# Patient Record
Sex: MALE | URBAN - METROPOLITAN AREA
[De-identification: names, ages, dates, MRNs, and addresses within clinical notes are randomized per-mention and may not be internally consistent; named-entity substitution may affect disease eponyms.]

---

## 2024-02-13 ENCOUNTER — EMERGENCY (EMERGENCY)
Facility: HOSPITAL | Age: 49
LOS: 1 days | Discharge: HOME | End: 2024-02-13
Attending: EMERGENCY MEDICINE
Payer: MEDICAID

## 2024-02-13 VITALS
OXYGEN SATURATION: 99 % | DIASTOLIC BLOOD PRESSURE: 83 MMHG | SYSTOLIC BLOOD PRESSURE: 128 MMHG | RESPIRATION RATE: 18 BRPM | TEMPERATURE: 98 F | HEART RATE: 97 BPM

## 2024-02-13 VITALS
SYSTOLIC BLOOD PRESSURE: 145 MMHG | RESPIRATION RATE: 22 BRPM | OXYGEN SATURATION: 96 % | TEMPERATURE: 99 F | DIASTOLIC BLOOD PRESSURE: 83 MMHG | HEART RATE: 98 BPM | WEIGHT: 160.06 LBS

## 2024-02-13 DIAGNOSIS — F22 DELUSIONAL DISORDERS: ICD-10-CM

## 2024-02-13 LAB
AMPHET UR-MCNC: POSITIVE
ANION GAP SERPL CALC-SCNC: 11 MMOL/L — SIGNIFICANT CHANGE UP (ref 5–17)
APAP SERPL-MCNC: <3 UG/ML — LOW (ref 10–26)
APPEARANCE UR: CLEAR — SIGNIFICANT CHANGE UP
BARBITURATES UR SCN-MCNC: NEGATIVE — SIGNIFICANT CHANGE UP
BASOPHILS # BLD AUTO: 0.01 K/UL — SIGNIFICANT CHANGE UP (ref 0–0.2)
BASOPHILS NFR BLD AUTO: 0.2 % — SIGNIFICANT CHANGE UP (ref 0–2)
BENZODIAZ UR-MCNC: NEGATIVE — SIGNIFICANT CHANGE UP
BILIRUB UR-MCNC: NEGATIVE — SIGNIFICANT CHANGE UP
BUN SERPL-MCNC: 13.9 MG/DL — SIGNIFICANT CHANGE UP (ref 8–20)
CALCIUM SERPL-MCNC: 9 MG/DL — SIGNIFICANT CHANGE UP (ref 8.4–10.5)
CHLORIDE SERPL-SCNC: 103 MMOL/L — SIGNIFICANT CHANGE UP (ref 96–108)
CO2 SERPL-SCNC: 25 MMOL/L — SIGNIFICANT CHANGE UP (ref 22–29)
COCAINE METAB.OTHER UR-MCNC: NEGATIVE — SIGNIFICANT CHANGE UP
COLOR SPEC: YELLOW — SIGNIFICANT CHANGE UP
CREAT SERPL-MCNC: 0.75 MG/DL — SIGNIFICANT CHANGE UP (ref 0.5–1.3)
DIFF PNL FLD: NEGATIVE — SIGNIFICANT CHANGE UP
EGFR: 111 ML/MIN/1.73M2 — SIGNIFICANT CHANGE UP
EOSINOPHIL # BLD AUTO: 0.02 K/UL — SIGNIFICANT CHANGE UP (ref 0–0.5)
EOSINOPHIL NFR BLD AUTO: 0.4 % — SIGNIFICANT CHANGE UP (ref 0–6)
ETHANOL SERPL-MCNC: <10 MG/DL — SIGNIFICANT CHANGE UP (ref 0–9)
GLUCOSE SERPL-MCNC: 122 MG/DL — HIGH (ref 70–99)
GLUCOSE UR QL: NEGATIVE MG/DL — SIGNIFICANT CHANGE UP
HCT VFR BLD CALC: 37.3 % — LOW (ref 39–50)
HGB BLD-MCNC: 12.8 G/DL — LOW (ref 13–17)
IMM GRANULOCYTES NFR BLD AUTO: 0.2 % — SIGNIFICANT CHANGE UP (ref 0–0.9)
KETONES UR-MCNC: 15 MG/DL
LEUKOCYTE ESTERASE UR-ACNC: NEGATIVE — SIGNIFICANT CHANGE UP
LYMPHOCYTES # BLD AUTO: 0.77 K/UL — LOW (ref 1–3.3)
LYMPHOCYTES # BLD AUTO: 14.9 % — SIGNIFICANT CHANGE UP (ref 13–44)
MCHC RBC-ENTMCNC: 28.9 PG — SIGNIFICANT CHANGE UP (ref 27–34)
MCHC RBC-ENTMCNC: 34.3 GM/DL — SIGNIFICANT CHANGE UP (ref 32–36)
MCV RBC AUTO: 84.2 FL — SIGNIFICANT CHANGE UP (ref 80–100)
METHADONE UR-MCNC: NEGATIVE — SIGNIFICANT CHANGE UP
MONOCYTES # BLD AUTO: 0.28 K/UL — SIGNIFICANT CHANGE UP (ref 0–0.9)
MONOCYTES NFR BLD AUTO: 5.4 % — SIGNIFICANT CHANGE UP (ref 2–14)
NEUTROPHILS # BLD AUTO: 4.09 K/UL — SIGNIFICANT CHANGE UP (ref 1.8–7.4)
NEUTROPHILS NFR BLD AUTO: 78.9 % — HIGH (ref 43–77)
NITRITE UR-MCNC: NEGATIVE — SIGNIFICANT CHANGE UP
OPIATES UR-MCNC: NEGATIVE — SIGNIFICANT CHANGE UP
PCP SPEC-MCNC: SIGNIFICANT CHANGE UP
PCP UR-MCNC: NEGATIVE — SIGNIFICANT CHANGE UP
PH UR: 6 — SIGNIFICANT CHANGE UP (ref 5–8)
PLATELET # BLD AUTO: 213 K/UL — SIGNIFICANT CHANGE UP (ref 150–400)
POTASSIUM SERPL-MCNC: 3.5 MMOL/L — SIGNIFICANT CHANGE UP (ref 3.5–5.3)
POTASSIUM SERPL-SCNC: 3.5 MMOL/L — SIGNIFICANT CHANGE UP (ref 3.5–5.3)
PROT UR-MCNC: SIGNIFICANT CHANGE UP MG/DL
RAPID RVP RESULT: SIGNIFICANT CHANGE UP
RBC # BLD: 4.43 M/UL — SIGNIFICANT CHANGE UP (ref 4.2–5.8)
RBC # FLD: 12.3 % — SIGNIFICANT CHANGE UP (ref 10.3–14.5)
SALICYLATES SERPL-MCNC: <0.6 MG/DL — LOW (ref 10–20)
SARS-COV-2 RNA SPEC QL NAA+PROBE: SIGNIFICANT CHANGE UP
SODIUM SERPL-SCNC: 139 MMOL/L — SIGNIFICANT CHANGE UP (ref 135–145)
SP GR SPEC: >1.03 — HIGH (ref 1–1.03)
THC UR QL: NEGATIVE — SIGNIFICANT CHANGE UP
UROBILINOGEN FLD QL: 1 MG/DL — SIGNIFICANT CHANGE UP (ref 0.2–1)
WBC # BLD: 5.18 K/UL — SIGNIFICANT CHANGE UP (ref 3.8–10.5)
WBC # FLD AUTO: 5.18 K/UL — SIGNIFICANT CHANGE UP (ref 3.8–10.5)

## 2024-02-13 PROCEDURE — 93010 ELECTROCARDIOGRAM REPORT: CPT

## 2024-02-13 PROCEDURE — 36415 COLL VENOUS BLD VENIPUNCTURE: CPT

## 2024-02-13 PROCEDURE — 90792 PSYCH DIAG EVAL W/MED SRVCS: CPT

## 2024-02-13 PROCEDURE — G1004: CPT

## 2024-02-13 PROCEDURE — 81003 URINALYSIS AUTO W/O SCOPE: CPT

## 2024-02-13 PROCEDURE — 80048 BASIC METABOLIC PNL TOTAL CA: CPT

## 2024-02-13 PROCEDURE — 70450 CT HEAD/BRAIN W/O DYE: CPT | Mod: 26,MG

## 2024-02-13 PROCEDURE — 80307 DRUG TEST PRSMV CHEM ANLYZR: CPT

## 2024-02-13 PROCEDURE — 0225U NFCT DS DNA&RNA 21 SARSCOV2: CPT

## 2024-02-13 PROCEDURE — 70450 CT HEAD/BRAIN W/O DYE: CPT | Mod: MG

## 2024-02-13 PROCEDURE — 93005 ELECTROCARDIOGRAM TRACING: CPT

## 2024-02-13 PROCEDURE — 85025 COMPLETE CBC W/AUTO DIFF WBC: CPT

## 2024-02-13 PROCEDURE — G0378: CPT

## 2024-02-13 PROCEDURE — 71045 X-RAY EXAM CHEST 1 VIEW: CPT

## 2024-02-13 PROCEDURE — 99223 1ST HOSP IP/OBS HIGH 75: CPT

## 2024-02-13 PROCEDURE — 99285 EMERGENCY DEPT VISIT HI MDM: CPT

## 2024-02-13 PROCEDURE — 71045 X-RAY EXAM CHEST 1 VIEW: CPT | Mod: 26

## 2024-02-13 RX ORDER — IBUPROFEN 200 MG
600 TABLET ORAL ONCE
Refills: 0 | Status: COMPLETED | OUTPATIENT
Start: 2024-02-13 | End: 2024-02-13

## 2024-02-13 RX ORDER — SODIUM CHLORIDE 9 MG/ML
1000 INJECTION, SOLUTION INTRAVENOUS ONCE
Refills: 0 | Status: COMPLETED | OUTPATIENT
Start: 2024-02-13 | End: 2024-02-13

## 2024-02-13 RX ORDER — CARBIDOPA AND LEVODOPA 25; 100 MG/1; MG/1
1 TABLET ORAL
Refills: 0 | Status: DISCONTINUED | OUTPATIENT
Start: 2024-02-13 | End: 2024-02-20

## 2024-02-13 RX ORDER — DIPHENHYDRAMINE HCL 50 MG
50 CAPSULE ORAL ONCE
Refills: 0 | Status: COMPLETED | OUTPATIENT
Start: 2024-02-13 | End: 2024-02-13

## 2024-02-13 RX ADMIN — SODIUM CHLORIDE 1000 MILLILITER(S): 9 INJECTION, SOLUTION INTRAVENOUS at 12:00

## 2024-02-13 RX ADMIN — CARBIDOPA AND LEVODOPA 1 TABLET(S): 25; 100 TABLET ORAL at 18:39

## 2024-02-13 RX ADMIN — Medication 600 MILLIGRAM(S): at 18:34

## 2024-02-13 NOTE — ED BEHAVIORAL HEALTH ASSESSMENT NOTE - SUMMARY
48 year old man  domiciled hs educated non caregiver employed though applying for disability no formal psychiatric history (no mental health diagnoses no prior inpatient psychiatric hospitalizations no outpatient mental health follow up no prior suicide attempts or non suicidal self injurious behavior no instances of violence aggression violation of orders of protection), [facility] parkinsons who was brought in by ems activated by ?police for evaluation. 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  appears to have chronic fixed false delusions possibly related to medication misuse vs parkinsons psychosis. Collateral informant is without any significant safety concerns, comfortable with patient's discharge. Patient does not wish to be admitted. Combined psychopharmacology and outpatient psychotherapy are the primary treatment modalities that are most likely to assist the patient in developing more productive means of managing his mental health conditions and navigating stressors/substance use, rather than an inpatient psychiatric admission.

## 2024-02-13 NOTE — ED ADULT TRIAGE NOTE - CHIEF COMPLAINT QUOTE
BIBEMS for bizarre behavior. As per wife pt was going to [employer] this am but winded up at the [employer] courts stating that he needs help at his house. PT has an autistic son at home who had an episode this am. PT with hx of parkinson's but non complaint with meds. PT twitching in bed and not answering RN questions. Placed in yellow gown.

## 2024-02-13 NOTE — ED CDU PROVIDER DISPOSITION NOTE - PATIENT PORTAL LINK FT
You can access the FollowMyHealth Patient Portal offered by [facility] by registering at the following website: [email or web address]. By joining [email or web address]’s FollowMyHealth portal, you will also be able to view your health information using other applications (apps) compatible with our system.

## 2024-02-13 NOTE — ED ADULT NURSE NOTE - CAS DISCH TRANSFER METHOD
Patient picked up by his wife after being cleared. Assisted to ED waiting room at this time./Private car

## 2024-02-13 NOTE — ED BEHAVIORAL HEALTH ASSESSMENT NOTE - DETAILS
16 year old as per above na Advised patient to go to nearest ER or call 911, for any of the followin) agitation aggression or anxiety, 2) suicidal or homicidal thoughts 3) worsening of symptoms or 4) side effects of medications

## 2024-02-13 NOTE — ED BEHAVIORAL HEALTH ASSESSMENT NOTE - HPI (INCLUDE ILLNESS QUALITY, SEVERITY, DURATION, TIMING, CONTEXT, MODIFYING FACTORS, ASSOCIATED SIGNS AND SYMPTOMS)
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    Per chart review  Current ED encounter  "48 M history of Parkinson's disease presenting with acute change in mental status.  As per EMR patient was in normal state of health this morning, was supposed to go to grocery store however ended up at courts stating he had a problem at home and referred by officials to emergency department for evaluation.  Patient is poor historian, awake and aware of himself and surroundings however selective and responses."    Received while resting in bed. Throughout encounter appears uncomfortable (states 2/2 chronic pain) with baseline resting tremors guarded. Clarified events leading up to presentation. States that earlier today went to the court house since he believed there would be law enforcement individuals present. Eventually activated 911 since he could not find any individuals. As for rationale, guarded. States that he's embarrassed and does not wish to be judged. Makes vague remarks about being concerned about his son's safety and his wife being "manipulative" and possibly narcissistic. Denies any acute or significant psychiatric issues concerns or complaints. Stable mood with no anhedonia or other depressive symptoms. Does not appear overtly manic. Denies any perceptual disturbances though may have ?chronic ?paranoid delusions.  States that he has parkinsons (for which he's prescribed carbidopa levodopa  mg qid rasagiline 1 mg daily pramipexole 0.5 mg bid - confirmed through [facility]). At times overuses his medications, taking max of 6 tablets of carbidopa levodopa during a day. Adamantly denies any suicidal intention; attempts at self medicating his parkinsons. Also obtains suboxone and adderall through community. Unsure of suboxone dose though adderall typically 30 mg daily. Last used both earlier today. No alcohol or tobacco use.    Collateral information obtained from wife vinicio [phone]  states that he's been more delusional and irritable since the summer. aware that he misuses his parkinsons medication as well as suspecting medications not prescribed to him as has a history of substance abuse (vicodin/opioids). did not express acute safety concerns though concerned he may be unpredictable. 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    Per chart review  Current ED encounter  "48 M history of Parkinson's disease presenting with acute change in mental status.  As per EMR patient was in normal state of health this morning, was supposed to go to grocery store however ended up at courts stating he had a problem at home and referred by officials to emergency department for evaluation.  Patient is poor historian, awake and aware of himself and surroundings however selective and responses."    Received while resting in bed. Throughout encounter appears uncomfortable (states 2/2 chronic pain) with baseline resting tremors guarded. Clarified events leading up to presentation. States that earlier today went to the court house since he believed there would be law enforcement individuals present. Eventually activated 911 since he could not find any individuals. As for rationale, guarded. States that he's embarrassed and does not wish to be judged. Makes vague remarks about being concerned about his son's safety and his wife being "manipulative" and possibly narcissistic. Denies any acute or significant psychiatric issues concerns or complaints. Stable mood with no anhedonia or other depressive symptoms. Does not appear overtly manic. Denies any perceptual disturbances though may have ?chronic ?paranoid delusions.  States that he has parkinsons (for which he's prescribed carbidopa levodopa  mg qid rasagiline 1 mg daily pramipexole 0.5 mg bid - confirmed through [facility]). At times overuses his medications, taking max of 6 tablets of carbidopa levodopa during a day. Adamantly denies any suicidal intention; attempts at self medicating his parkinsons. Also obtains suboxone and adderall through community. Unsure of suboxone dose though adderall typically 30 mg daily. Last used both earlier today. No alcohol or tobacco use.    Collateral information obtained from wife vinicio [phone]  states that he's been delusional and irritable since the summer. aware that he misuses his parkinsons medication as well as suspecting medications not prescribed to him as has a history of substance abuse (vicodin/opioids). did not express acute safety concerns though concerned he may be unpredictable. comfortable with his clearance/discharge back to home.    patient comfortable with discharge with outpatient mental health follow up, speak with  regarding fsl appointment.

## 2024-02-13 NOTE — ED PROVIDER NOTE - CLINICAL SUMMARY MEDICAL DECISION MAKING FREE TEXT BOX
Will reach out to wife to obtain collateral, patient presenting with possible extrapyramidal symptoms likely secondary to antipsychotic medications.  Cannot obtain medical records at this time we will treat empirically with lorazepam and diphenhydramine.  Broad-based workup including CT imaging of the head. Will reach out to wife to obtain collateral, patient presenting with possible extrapyramidal symptoms likely secondary to antipsychotic medications.  Cannot obtain medical records at this time we will treat empirically with lorazepam and diphenhydramine.  Broad-based workup including CT imaging of the head.    Obtained collateral information from wife and she states that he has been paranoid over the past couple of weeks thinking people are out to get him, she also has accused her of prostitution.  She recently discovered he has a second phone out of paranoia of being tapped.  On reevaluation patient is awake alert however extremely paranoid and agitated, he feels that his son is being abused in the home and his wife is prostituting herself amongst his friends.  He admits to holding a second phone out of fear of being spyed on.  At this point given a grossly negative CT imaging of the head, unremarkable chest x-ray and laboratory studies positive for amphetamines in urine, will transfer to behavioral health for psychiatric evaluation.

## 2024-02-13 NOTE — ED ADULT NURSE NOTE - NSFALLRISKINTERV_ED_ALL_ED

## 2024-02-13 NOTE — ED BEHAVIORAL HEALTH ASSESSMENT NOTE - RISK ASSESSMENT
rf:  medical issues  substance misuse    pf:  family support  domiciled  educated  no prior psych history  no prior sa/nssib/violence

## 2024-02-13 NOTE — ED PROVIDER NOTE - PHYSICAL EXAMINATION
Vitals: Mildly hypertensive  Gen: Laying in stretcher with apparently involuntary uncoordinated skeletal muscle contractions and movements, appears restless   Head: NCAT    ENT: EOMI. No exudates  CV: RRR. Audible S1 and S2. No murmurs. 2+ radial and DP pulses   Pulm: Clear to auscultation bilaterally. No wheezes, rales, or rhonchi  Abd: soft, NTND, no rebound, no guarding  Musculoskeletal:  No peripheral edema, no calf ttp  Neurologic: Awake and able to answer questions appropriately, no obvious focal deficits, apparently involuntary uncoordinated movements of upper and lower extremities predominantly right-sided  : no CVA tenderness

## 2024-02-13 NOTE — ED PROVIDER NOTE - OBJECTIVE STATEMENT
48 M history of Parkinson's disease presenting with acute change in mental status.  As per EMR patient was in normal state of health this morning, was supposed to go to grocery store however ended up at courts stating he had a problem at home and referred by officials to emergency department for evaluation.  Patient is poor historian, awake and aware of himself and surroundings however selective and responses.

## 2024-02-13 NOTE — ED BEHAVIORAL HEALTH ASSESSMENT NOTE - OTHER PAST PSYCHIATRIC HISTORY (INCLUDE DETAILS REGARDING ONSET, COURSE OF ILLNESS, INPATIENT/OUTPATIENT TREATMENT)
no formal psychiatric history  no prior suicide attempts or non suicidal self injurious behavior  no instances of violence aggression violation of orders of protection  no inpatient psychiatric hospitalizations  no outpatient mental health follow up

## 2024-02-13 NOTE — ED CDU PROVIDER DISPOSITION NOTE - NS ED ATTENDING STATEMENT MOD
Attending Only [facility]    Medicine Progress Note - Maroon 4    Date of Admission:  1/22/2024    Assessment & Plan   Stefani Crowell is a 60 year old female admitted on 1/22/2024. She has a history of cirrhosis secondary to alcohol use currently being worked up for liver transplant complicated by need for frequent paracenteses and thoracenteses for ascites/hepatic hydrothorax, in addition to asthma and COPD 2/2 tobacco use, MDD/STEPHANIE, hypothyroidism, low back pain, and chronic hyponatremia. Admitted with decompensated cirrhosis, and E coli empyema. Course complicated by recurrent ascites, renal injury with moderate-severe hyponatremia and debility. Transitioned to comfort cares 2/9 with plans for home hospice on discharge with PleurX in place.     Plan Today:  - PleurX placement today pending results of TEG   - 1 x time dose of lactulose given increased lethargy    Decompensated alcoholic cirrhosis (Ascites, HE, EV, hepatic hydrothorax)  Gets thoras and courtney through the [facility] system. During prior hospitalization, her transplant eligibility was reopened due to completion of chemotherapy treatment and improved renal function, however with new renal failure, ongoing hyponatremia, debility/fraility she would not be a transplant candidate. Not a candidate for TIPS. MELD-Na scores persistently in the 30s.  - Hepatology following, appreciate recs  - Intermittent CAPS consult for paracentesis; had multiple paracenteses this hospitalization  - Tylenol 650mg Q8H PRN (total dose < 2g)  - Continue rifaximin, change lactulose to Miralax and titrate to <4 BM in 24 hours    > 1 x time dose of lactulose 2/12 given increased lethargy  - Continue PPI  - PleurX placement today pending results of TEG  - Abx as below, continue IV until abdominal pleurx is placed in order to temporize until discharge to home on hospice  - Initiated comfort cares 2/9/24    E coli empyema - improving on top of a history  of recurrent hepatic hydropneumothorax c/b trapped lung  On 1/23, had thora + paracentesis per IR and pleural fluid cultures yielded GNB -> E coli. Blood cultures yielded GPC, though this is most likely a contaminant. Interventional pulm placed chest tube on 1/24. CT chest on 1/25 demonstrated improved R-sided hydropneumothorax after. Repeat CT chest on 1/29 demonstrated increased gas component of the R hydropneumothorax w/o changes in overall size. Patient tolerated 6 doses of alteplase total. Repeat pleural fluid labs on 1/29 were consistent w/ ongoing infection, though marginally improved. Additionally, fluid labs sent on 2/1 demonstrated slightly improved cell count, prompting chest tube removal. With transition to comfort cares, plan to continue IV abx until after the pleurx has been placed and switch to PO after PleurX placement.   - S/p chest tube placement, now removed  - Pulmonary Consulted   > Chest tube placed 1/24, removed 2/1  - Blood cultures:   > 1/23: Staph epi. (suspect contaminant)   > 1/24: NGTD  - Pleural fluid culture:   > 1/23: E coli (Amp res.)  - Infectious Disease consulted (1/24)   > Resumed IV Ceftriaxone 1g every day, added metronidazole 500mg Q8H through PICC (2/1)  - Abx:  > Ceftriaxone: 1/23 - 1/24, 1/25 - *   > Metronidazole 500mg Q8H: 1/25 - *   > Rifaximin: 1/22 - *  > Zosyn: 1/24 - 1/25  > Vancomycin: 1/24  [  ] Dispo: Will transition to oral antibiotics for discharge home on hospice, okay to discontinue when she is unable to swallow      MATTIE and Acute on Chronic Hyponatremia due to HRS  Pt has hx of chronic hyponatremia, baseline around 122-125, she has been admitted for hyponatremia several times in the past. Presented with Na of 117, corrected with 2% NS but began to trend down when discontinued. Also with rising BUN. Nephrology re-consulted but thought not to be good candidate for dialysis with low blood pressures. Overall, prognosis poor with both liver failure and renal  failure. Now on comfort cares and anticipate home hospice cares.  - Nephrology consulted - appreciate recs  - Continue Midodrine 15mg PO TID PRN  - Discontinue strict ins/outs, daily weights   - Regular diet  - Discontinue repeat lab work-up iso transition to comfort cares    Severe malnutrition in context of chronic illness  Patient's phos was appropriate on 1/22.   - Nutrition following    Borderline Ascending Aortic Aneurysm  Noted on CT Chest on 1/25. Measured to be 4.3 cm. Reviewed the CT chest and found largest diameter to be about 43.5mm. Compared this to the widest portion on of the ascending aorta on her CTA coronary on 12/15/23 which was 42.0mm. This is fairly stable.  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 Holding off on prednisone for now iso possible infection and respiratory stability  - Continue PRN albuterol inhaler or nebs  - Continue ICS-LABA  - Continue Singulair       Hypothyroidism  - Continue PTA levothyroxine      Normocytic anemia - stable  Has required outpatient white blood cell transfusions in the past.  Her hemoglobin tends to be around 7.5-9.5.  At normal range on admission. No clear bleeding history though is at risk for this. Chronic disease component and nutritional components possible as well. Hgb on 1/24 was 7.5, which is likely dilutional w/ fluids. No sources of bleeding identified.  - Will discontinue repeat lab work-up due to transition to comfort cares      Thrombocytopenia - stable due to ESLD  Has an increased bleeding risk, avoiding heparin/lovenox.      MDD/STEPHANIE  - Continue PTA Celexa  - Would try to avoid sedating agents d/t current sedative presentation     Back pain  Stable, trial hot/cold packs first  - Lidocaine patch prn          Diet: Room Service  Snacks/Supplements Adult: Other; Ensure enlive vanilla or  strawberry at 10:00; Between Meals  Snacks/Supplements Adult: Other; Ensure enlive with bkf , lunch and dinner trays ( do not count with fluid restrictions); With Meals  NPO per Anesthesia Guidelines for Procedure/Surgery Except for: Meds    DVT Prophylaxis: Pneumatic Compression Devices  Demarco Catheter: Not present  Lines: PRESENT      PICC 02/01/24 Single Lumen Left Brachial vein lateral Antibiotics-Site Assessment: WDL except;Bleeding    Cardiac Monitoring: None  Code Status: No CPR- Do NOT Intubate      Clinically Significant Risk Factors              # Hypoalbuminemia: Lowest albumin = 2.9 g/dL at 2/8/2024  6:10 AM, will monitor as appropriate    # Coagulation Defect: INR = 3.24 (Ref range: 0.85 - 1.15) and/or PTT = 76 Seconds (Ref range: 22 - 38 Seconds), will monitor for bleeding  # Thrombocytopenia: Lowest platelets = 55 in last 2 days, will monitor for bleeding           # Severe Malnutrition: based on nutrition assessment    # Financial/Environmental Concerns: none  # Asthma: noted on problem list        Disposition Plan      Expected Discharge Date: 02/14/2024      Destination: home with family  Discharge Comments: Dispo: Home hospice - Transition to comfort cares, DNR/DNI  Progress: PC x3/week; Cont. Miralax & Rifaximin: (1/22 - *)  Plan: Abx: IV Ceftriaxone (1/25 - *); PO Metronidazole (1/25 - *)          Quinton Rajan, MS4    Modesta Palencia, DO  Internal Medicine PGY-3    Staffed with Dr. Philip TOVAR [facility]  Securely message with [employer] (more info)  Text page via [facility] Paging/Directory   See signed in provider for up to date coverage information  ______________________________________________________________________    Interval History   No acute events overnight. Paracentesis site with increased leakage. Increase in bleeding per RN with drainage from site and possibly GI with darker stools. Patient reports some pain today in back and abdomen.      Physical Exam   Vital Signs: Temp: (!) 95.3  F (35.2  C) Temp src: Temporal                Weight: 135 lbs 5.8 oz    Constitutional: More lethargic than prior but able to answer all questions appropriately   Respiratory: CTAB, breathing comfortably on room air  Cardiovascular: RRR, 3/6 systolic murmur  GI: Soft, mild tenderness to palpation, normoactive BS, moderately distended.  Dressing in place on left abdomen over prior paracentesis site with slight serosanguineous drainage  Skin/Integumen: Scattered spider angiomata

## 2024-02-13 NOTE — ED CDU PROVIDER DISPOSITION NOTE - NSFOLLOWUPINSTRUCTIONS_ED_ALL_ED_FT
SEEK IMMEDIATE MEDICAL CARE IF YOU HAVE ANY OF THE FOLLOWING SYMPTOMS: worsening chest pain, coughing up blood, unexplained back/neck/jaw pain, severe abdominal pain, dizziness or lightheadedness, fainting, shortness of breath, sweaty or clammy skin, vomiting, or racing heart beat. These symptoms may represent a serious problem that I s an emergency. Do not wait to see if the symptoms will go away. Get medical help right away. Call 911 and do not drive yourself to the hospital.

## 2024-02-13 NOTE — ED ADULT NURSE NOTE - CHIEF COMPLAINT QUOTE
BIBEMS for bizarre behavior. As per wife pt was going to [employer] this am but winded up at the [employer] courts stating that he needs help at his house. PT has an autistic son at home who had an episode this am. PT with hx of parkinson's but non complaint with meds. PT twitching in bed and not answering RN questions. Placed in yellow gown.

## 2024-02-13 NOTE — ED BEHAVIORAL HEALTH ASSESSMENT NOTE - REFERRAL / APPOINTMENT DETAILS
discussed with social work; social work will assist with arranging family service league/outpatient mental health appointment

## 2024-02-13 NOTE — CHART NOTE - NSCHARTNOTEFT_GEN_A_CORE
SWNote: pt seen by behavioral MD (Nicole) pt to benefit from therapy . FSL services explained , My: pt seen by behavioral MD (Nicole) pt to benefit from therapy . [facility] services explained , in agreement to services . Appt given for Friday feb 16 at 11:30 am . Release of info signed per protocol . Aware services via telephone, best number to reach pt [phone]. . Encouraged to call 911 or to go to nearest ED if symptoms worsen. Email to be sent asap.

## 2024-02-13 NOTE — ED CDU PROVIDER DISPOSITION NOTE - CLINICAL COURSE
Seen initially for delusional and agitated behavior, relatively new onset of paranoia progressive over weeks.  Seen and evaluated by psychiatry.  Discussed patient's care with psychiatry, believe that combined psychopharmacology and outpatient psychotherapy are primary treatment modalities in managing his mental health conditions and does not require inpatient psychiatric care.  Disposition made in collaboration with social work, otherwise stable for discharge and outpatient therapy. 27-Aug-2018

## 2024-02-13 NOTE — ED CDU PROVIDER INITIAL DAY NOTE - CLINICAL SUMMARY MEDICAL DECISION MAKING FREE TEXT BOX
Presenting with paranoid behavior and delusional thoughts, transferred to behavioral health for psychiatric evaluation and close observation

## 2024-02-13 NOTE — ED BEHAVIORAL HEALTH ASSESSMENT NOTE - DIFFERENTIAL
unspecified psychosis r/o medication related vs parkinsons psychosis delusional ideas r/o medication related vs parkinsons psychosis

## 2024-02-13 NOTE — ED ADULT NURSE NOTE - OBJECTIVE STATEMENT
c/o bizarre behavior. Pt stated he went to find a  but couldn't find one so he went to the courts. Pt stated he was looking for a  because he thought he was going to get in trouble at home for taking unprescribed Adderall and Suboxone. Pt awake and alert, Aox4, speaking coherently, respirations even and unlabored on RA, skin warm and dry.

## 2024-02-13 NOTE — ED BEHAVIORAL HEALTH ASSESSMENT NOTE - DESCRIPTION
parkinsons Vital Signs Last 24 Hrs  T(C): 36.8 (13 Feb 2024 15:20), Max: 37.1 (13 Feb 2024 09:44)  T(F): 98.2 (13 Feb 2024 15:20), Max: 98.7 (13 Feb 2024 09:44)  HR: 97 (13 Feb 2024 15:20) (85 - 98)  BP: 128/83 (13 Feb 2024 15:20) (128/83 - 152/90)  BP(mean): --  RR: 18 (13 Feb 2024 15:20) (18 - 22)  SpO2: 99% (13 Feb 2024 15:20) (96% - 100%)    Parameters below as of 13 Feb 2024 15:20  Patient On (Oxygen Delivery Method): room air  domiciled employed hs educated

## 2024-02-13 NOTE — ED BEHAVIORAL HEALTH ASSESSMENT NOTE - NSBHROSSTATEMENT_PSY_A_CORE
"  [facility]  Occupational Therapy Treatment Note    Date: 2/11/2022  Name: Autumn LOWERY [facility] Number: [id number]    Therapy Diagnosis:   Encounter Diagnosis   Name Primary?    Lymphedema of both lower extremities Yes     Physician: Lucio Anthony MD*    Physician Orders: treat lower extremity lymphedema  Medical Diagnosis: lymphedema  Surgical Procedure and Date: na,   Evaluation Date:   Insurance Authorization Period Expiration: 3-  Plan of Care Expiration: 3-  Visit # / Visits authorized: 2/10     Precautions:  Immunosuppression    Time In: 1200  Time Out: 1300  Total Billable Time: 60 minutes    SUBJECTIVE     Pt reports: "These are more comfortable than the stockings."    Response to previous treatment: good, tolerated garments well  Functional change: na    Pain: 0/10    OBJECTIVE     Objective Measures updated at progress report unless specified.    Treatment     Autumn received the treatments listed below:     Manual therapy techniques: Manual Lymphatic Drainage were applied for 60 minutes, including:  Patient presented with Sigvaris compreflex garments bilaterally. Skin care completed. Measurements taken and documented. Initiated MLD with patient in supine. Sequencing per protocol for bilateral lower extremity lymphedema was completed.  At end of session, sigvaris compreflex lite garments were re applied.  Discussed other compression options but with patient's history of lupus and fibromyalgia, many options are painful to patient. Will continue with the Sigvaris at this time.      Patient Education and Home Exercises      Education provided:       ReEducated on Phase 1 and 2 of protocol.  Reviewed treatment frequency and likely duration of weeks  Contraindications for treatment.  Plan of care and goals.   Educated on home management protocols.    Compression garment options    Written Home Exercises Provided: no. Pt does not need additional exercises to " assist with the reduction of her lymphedema..    Assessment     Pt would continue to benefit from skilled OT. Yes, Autumn  Is motivated and presents this time with a better understanding of what is required to be successful in this program.    Autumn is progressing well towards her goals and there are no updates to goals at this time. Pt prognosis is Good.     Pt will continue to benefit from skilled outpatient occupational therapy to address the deficits listed in the problem list on initial evaluation provide pt/family education and to maximize pt's level of independence in the home and community environment.     Pt's spiritual, cultural and educational needs considered and pt agreeable to plan of care and goals.    Anticipated barriers to occupational therapy: chronic disease    Goals:  Short Term Goals for 2 weeks: (phase 1 of protocol)  Complete decongestion B LE- Ongoing   Patient will be educated on lymphedema precautions and signs of infection. - Ongoing and repeated at each session  Patient will perform deep abdominal breathing TID- met this date  Patient will tolerate daily activities with multilayered bandaging.- using garments instead  Appropriate compression garments to be ordered/delivered- has purchased Sigvaris compreflex     Long Term Goals for 8 weeks: (Phase 2 of goals)  Patient will be independent with home management of this chronic condition. Ongoing education and changes to program  Patient to romel/doff compression garment. Initiated education and practice this date  Patient will demonstrate compliance with all home management recommendations.ongoing  Patient will maintain reduction at monthly follow up appointments for 3 months     PLAN     Continue plan of care 1-2 times a week to address goals above.  Updates/Grading for next session: continue with phase 1    MATIAS Brown/CYNTHIA       .

## 2024-02-13 NOTE — ED ADULT NURSE REASSESSMENT NOTE - NS ED NURSE REASSESS COMMENT FT1
Pt awake and alert, Aox4, respirations even and unlabored on RA, skin warm and dry. IV discontinued, VS recorded. Security present for transport to , belongings transported with pt to . Report given to DANELLE Mullins.

## 2024-02-13 NOTE — ED PROVIDER NOTE - NS_EDPROVIDERDISPOUSERTYPE_ED_A_ED
Chief Complaint   Patient presents with    Hypertension     followup Attending Attestation (For Attendings USE Only)...

## 2024-02-14 ENCOUNTER — EMERGENCY (EMERGENCY)
Facility: HOSPITAL | Age: 49
LOS: 1 days | Discharge: OTHER INSTITUTION | End: 2024-02-14
Attending: EMERGENCY MEDICINE
Payer: MEDICAID

## 2024-02-14 VITALS
DIASTOLIC BLOOD PRESSURE: 73 MMHG | WEIGHT: 154.98 LBS | HEART RATE: 146 BPM | RESPIRATION RATE: 16 BRPM | SYSTOLIC BLOOD PRESSURE: 124 MMHG | TEMPERATURE: 98 F | OXYGEN SATURATION: 98 %

## 2024-02-14 DIAGNOSIS — F11.10 OPIOID ABUSE, UNCOMPLICATED: ICD-10-CM

## 2024-02-14 DIAGNOSIS — F15.10 OTHER STIMULANT ABUSE, UNCOMPLICATED: ICD-10-CM

## 2024-02-14 DIAGNOSIS — F29 UNSPECIFIED PSYCHOSIS NOT DUE TO A SUBSTANCE OR KNOWN PHYSIOLOGICAL CONDITION: ICD-10-CM

## 2024-02-14 PROBLEM — Z86.69 PERSONAL HISTORY OF OTHER DISEASES OF THE NERVOUS SYSTEM AND SENSE ORGANS: Chronic | Status: ACTIVE | Noted: 2024-02-13

## 2024-02-14 LAB
ALBUMIN SERPL ELPH-MCNC: 4.2 G/DL — SIGNIFICANT CHANGE UP (ref 3.3–5.2)
ALP SERPL-CCNC: 52 U/L — SIGNIFICANT CHANGE UP (ref 40–120)
ALT FLD-CCNC: <5 U/L — SIGNIFICANT CHANGE UP
AMPHET UR-MCNC: POSITIVE
ANION GAP SERPL CALC-SCNC: 11 MMOL/L — SIGNIFICANT CHANGE UP (ref 5–17)
APAP SERPL-MCNC: <3 UG/ML — LOW (ref 10–26)
APPEARANCE UR: CLEAR — SIGNIFICANT CHANGE UP
AST SERPL-CCNC: 47 U/L — HIGH
BACTERIA # UR AUTO: ABNORMAL /HPF
BARBITURATES UR SCN-MCNC: NEGATIVE — SIGNIFICANT CHANGE UP
BASOPHILS # BLD AUTO: 0.02 K/UL — SIGNIFICANT CHANGE UP (ref 0–0.2)
BASOPHILS NFR BLD AUTO: 0.3 % — SIGNIFICANT CHANGE UP (ref 0–2)
BENZODIAZ UR-MCNC: NEGATIVE — SIGNIFICANT CHANGE UP
BILIRUB SERPL-MCNC: 0.4 MG/DL — SIGNIFICANT CHANGE UP (ref 0.4–2)
BILIRUB UR-MCNC: ABNORMAL
BUN SERPL-MCNC: 11.6 MG/DL — SIGNIFICANT CHANGE UP (ref 8–20)
CALCIUM SERPL-MCNC: 9 MG/DL — SIGNIFICANT CHANGE UP (ref 8.4–10.5)
CAST: 12 /LPF — HIGH (ref 0–4)
CHLORIDE SERPL-SCNC: 103 MMOL/L — SIGNIFICANT CHANGE UP (ref 96–108)
CO2 SERPL-SCNC: 26 MMOL/L — SIGNIFICANT CHANGE UP (ref 22–29)
COCAINE METAB.OTHER UR-MCNC: NEGATIVE — SIGNIFICANT CHANGE UP
COD CRY URNS QL: PRESENT
COLOR SPEC: SIGNIFICANT CHANGE UP
CREAT SERPL-MCNC: 0.91 MG/DL — SIGNIFICANT CHANGE UP (ref 0.5–1.3)
DIFF PNL FLD: NEGATIVE — SIGNIFICANT CHANGE UP
EGFR: 103 ML/MIN/1.73M2 — SIGNIFICANT CHANGE UP
EOSINOPHIL # BLD AUTO: 0.01 K/UL — SIGNIFICANT CHANGE UP (ref 0–0.5)
EOSINOPHIL NFR BLD AUTO: 0.1 % — SIGNIFICANT CHANGE UP (ref 0–6)
ETHANOL SERPL-MCNC: <10 MG/DL — SIGNIFICANT CHANGE UP (ref 0–9)
GLUCOSE SERPL-MCNC: 135 MG/DL — HIGH (ref 70–99)
GLUCOSE UR QL: NEGATIVE MG/DL — SIGNIFICANT CHANGE UP
HCT VFR BLD CALC: 38 % — LOW (ref 39–50)
HGB BLD-MCNC: 13.1 G/DL — SIGNIFICANT CHANGE UP (ref 13–17)
IMM GRANULOCYTES NFR BLD AUTO: 0.1 % — SIGNIFICANT CHANGE UP (ref 0–0.9)
KETONES UR-MCNC: ABNORMAL MG/DL
LEUKOCYTE ESTERASE UR-ACNC: NEGATIVE — SIGNIFICANT CHANGE UP
LYMPHOCYTES # BLD AUTO: 1.41 K/UL — SIGNIFICANT CHANGE UP (ref 1–3.3)
LYMPHOCYTES # BLD AUTO: 20.8 % — SIGNIFICANT CHANGE UP (ref 13–44)
MCHC RBC-ENTMCNC: 28.6 PG — SIGNIFICANT CHANGE UP (ref 27–34)
MCHC RBC-ENTMCNC: 34.5 GM/DL — SIGNIFICANT CHANGE UP (ref 32–36)
MCV RBC AUTO: 83 FL — SIGNIFICANT CHANGE UP (ref 80–100)
METHADONE UR-MCNC: NEGATIVE — SIGNIFICANT CHANGE UP
MONOCYTES # BLD AUTO: 0.42 K/UL — SIGNIFICANT CHANGE UP (ref 0–0.9)
MONOCYTES NFR BLD AUTO: 6.2 % — SIGNIFICANT CHANGE UP (ref 2–14)
MUCOUS THREADS # UR AUTO: PRESENT
NEUTROPHILS # BLD AUTO: 4.91 K/UL — SIGNIFICANT CHANGE UP (ref 1.8–7.4)
NEUTROPHILS NFR BLD AUTO: 72.5 % — SIGNIFICANT CHANGE UP (ref 43–77)
NITRITE UR-MCNC: NEGATIVE — SIGNIFICANT CHANGE UP
OPIATES UR-MCNC: NEGATIVE — SIGNIFICANT CHANGE UP
PCP SPEC-MCNC: SIGNIFICANT CHANGE UP
PCP UR-MCNC: NEGATIVE — SIGNIFICANT CHANGE UP
PH UR: 6 — SIGNIFICANT CHANGE UP (ref 5–8)
PLATELET # BLD AUTO: 242 K/UL — SIGNIFICANT CHANGE UP (ref 150–400)
POTASSIUM SERPL-MCNC: 3 MMOL/L — LOW (ref 3.5–5.3)
POTASSIUM SERPL-SCNC: 3 MMOL/L — LOW (ref 3.5–5.3)
PROT SERPL-MCNC: 6.7 G/DL — SIGNIFICANT CHANGE UP (ref 6.6–8.7)
PROT UR-MCNC: 30 MG/DL
RBC # BLD: 4.58 M/UL — SIGNIFICANT CHANGE UP (ref 4.2–5.8)
RBC # FLD: 12.3 % — SIGNIFICANT CHANGE UP (ref 10.3–14.5)
RBC CASTS # UR COMP ASSIST: 1 /HPF — SIGNIFICANT CHANGE UP (ref 0–4)
SALICYLATES SERPL-MCNC: <0.6 MG/DL — LOW (ref 10–20)
SODIUM SERPL-SCNC: 140 MMOL/L — SIGNIFICANT CHANGE UP (ref 135–145)
SP GR SPEC: >1.03 — HIGH (ref 1–1.03)
SQUAMOUS # UR AUTO: 1 /HPF — SIGNIFICANT CHANGE UP (ref 0–5)
THC UR QL: NEGATIVE — SIGNIFICANT CHANGE UP
TSH SERPL-MCNC: 3.18 UIU/ML — SIGNIFICANT CHANGE UP (ref 0.27–4.2)
UROBILINOGEN FLD QL: 1 MG/DL — SIGNIFICANT CHANGE UP (ref 0.2–1)
WBC # BLD: 6.78 K/UL — SIGNIFICANT CHANGE UP (ref 3.8–10.5)
WBC # FLD AUTO: 6.78 K/UL — SIGNIFICANT CHANGE UP (ref 3.8–10.5)
WBC UR QL: 1 /HPF — SIGNIFICANT CHANGE UP (ref 0–5)

## 2024-02-14 PROCEDURE — 99223 1ST HOSP IP/OBS HIGH 75: CPT

## 2024-02-14 PROCEDURE — 99213 OFFICE O/P EST LOW 20 MIN: CPT

## 2024-02-14 PROCEDURE — 93010 ELECTROCARDIOGRAM REPORT: CPT

## 2024-02-14 RX ORDER — CARBIDOPA AND LEVODOPA 25; 100 MG/1; MG/1
1 TABLET ORAL
Refills: 0 | Status: DISCONTINUED | OUTPATIENT
Start: 2024-02-14 | End: 2024-02-21

## 2024-02-14 RX ORDER — PRAMIPEXOLE DIHYDROCHLORIDE 0.12 MG/1
0.5 TABLET ORAL
Refills: 0 | Status: DISCONTINUED | OUTPATIENT
Start: 2024-02-14 | End: 2024-02-21

## 2024-02-14 RX ORDER — IBUPROFEN 200 MG
600 TABLET ORAL ONCE
Refills: 0 | Status: COMPLETED | OUTPATIENT
Start: 2024-02-14 | End: 2024-02-14

## 2024-02-14 RX ORDER — RASAGILINE 0.5 MG/1
1 TABLET ORAL DAILY
Refills: 0 | Status: DISCONTINUED | OUTPATIENT
Start: 2024-02-14 | End: 2024-02-21

## 2024-02-14 RX ORDER — POTASSIUM CHLORIDE 20 MEQ
40 PACKET (EA) ORAL ONCE
Refills: 0 | Status: COMPLETED | OUTPATIENT
Start: 2024-02-14 | End: 2024-02-14

## 2024-02-14 RX ORDER — OXYCODONE AND ACETAMINOPHEN 5; 325 MG/1; MG/1
1 TABLET ORAL ONCE
Refills: 0 | Status: DISCONTINUED | OUTPATIENT
Start: 2024-02-14 | End: 2024-02-14

## 2024-02-14 RX ADMIN — CARBIDOPA AND LEVODOPA 1 TABLET(S): 25; 100 TABLET ORAL at 17:06

## 2024-02-14 RX ADMIN — CARBIDOPA AND LEVODOPA 1 TABLET(S): 25; 100 TABLET ORAL at 22:55

## 2024-02-14 RX ADMIN — Medication 600 MILLIGRAM(S): at 16:23

## 2024-02-14 RX ADMIN — CARBIDOPA AND LEVODOPA 1 TABLET(S): 25; 100 TABLET ORAL at 10:58

## 2024-02-14 RX ADMIN — Medication 40 MILLIEQUIVALENT(S): at 11:02

## 2024-02-14 RX ADMIN — RASAGILINE 1 MILLIGRAM(S): 0.5 TABLET ORAL at 12:16

## 2024-02-14 RX ADMIN — Medication 600 MILLIGRAM(S): at 18:26

## 2024-02-14 RX ADMIN — PRAMIPEXOLE DIHYDROCHLORIDE 0.5 MILLIGRAM(S): 0.12 TABLET ORAL at 17:06

## 2024-02-14 NOTE — ED PROVIDER NOTE - DIFFERENTIAL DIAGNOSIS
Differential Diagnosis acute psychosis vs delusional disorder vis schizophrenia vs bipolar disorder vs medication side effect

## 2024-02-14 NOTE — ED CDU PROVIDER INITIAL DAY NOTE - DIFFERENTIAL DIAGNOSIS
acute psychosis vs delusional disorder vis schizophrenia vs bipolar disorder vs medication side effect Differential Diagnosis

## 2024-02-14 NOTE — ED BEHAVIORAL HEALTH ASSESSMENT NOTE - HPI (INCLUDE ILLNESS QUALITY, SEVERITY, DURATION, TIMING, CONTEXT, MODIFYING FACTORS, ASSOCIATED SIGNS AND SYMPTOMS)
48 year old man  domiciled hs educated non caregiver employed though applying for disability no formal psychiatric history (no mental health diagnoses no prior inpatient psychiatric hospitalizations no outpatient mental health follow up no prior suicide attempts or non suicidal self injurious behavior no instances of violence aggression violation of orders of protection), [facility] parkinsons who was brought in by ems after police were called to his house for PI including locking self in bedroom and breaking window to flee when police were at his door.    Pt was in ER 2/13 allegedly for AMS.  Had left home to go to store but ended up at courts as he knew police would be there that could protect him.  Patient was noted to be a poor historian, awake and aware of himself and surroundings however selective and responses.  Made vague remarks about being concerned about his son's safety and his wife being "manipulative" and possibly narcissistic.  Denies any acute or significant psychiatric issues concerns or complaints. Stable mood with no anhedonia or other depressive symptoms. Does not appear overtly manic. Denies any perceptual disturbances though may have ?chronic ?paranoid delusions.    On interview now, pt somewhat disorganized saying he went mesha but was worried wife would be mad at him which made him more paranoid for past couple days.   He isn't sure how  got there but then they knocked on his bedroom door, he wasn't sure they were really police or people trying to hurt him so instead of opening door, broke window and ran out as he saw police in his yard in uniform that "could protect me".  Pt not able to explain why he needs protection, answering with "you see, it's like this, I don't want to answer as I worry the information will be used against me".    Does admit he has had more discord with wife recently but can not explain about what.    Does admit he took an extra Sinamet yesterday (5 instead of 4) and take Suboxone and Adderall he gets from "the community.   States Suboxone is for pain and does not answer what Adderall is for.   Makes reference to this being something that would upset wife if "misrepresented".      He has parkinsons (for which he's prescribed carbidopa levodopa  mg qid rasagiline 1 mg daily pramipexole 0.5 mg bid - confirmed through [facility]). At times overuses his medications, previously taking up to 6 tablets of carbidopa levodopa during a day.  Denies any suicidal intention; attempts at self medicating his parkinsons.  Unsure of suboxone dose though adderall typically 30 mg daily. Last used both earlier today. No alcohol or tobacco use.  Utox only pos for stimulants.  Had neg [facility] earlier today.    Earlier today collateral information obtained from wife Faustina [phone] who states that he's been delusional and irritable since the summer. aware that he misuses his parkinsons medication as well as suspecting medications not prescribed to him as has a history of substance abuse (vicodin/opioids).     At end of interview, pt continues to say he does not feel safe and requests police in uniform come to watch him as he feels he can't trust anyone else.   Discussed with pt medication to help with anxiety (eg. Seroquel) but pt denies to accept meds voluntarily.

## 2024-02-14 NOTE — CONSULT NOTE ADULT - ASSESSMENT
The patient is a 48y Male who is followed by neurology because of Parkinson's disease    Parkinson's disease  Diagnosed by Dr Mccormick a few years ago.  Has mildmotr symptoms of Parkinson's disease   would continue home medication regimen  per Behavioral health note he may take more Parkinson's disease meds than prescribed as well as obtains adderall and suboxone from the street  can not exclude effect of dopaminergic medication on psychosis    There is no neurologic contraindication to inpatient admission.    He should continue to follow with Dr Mccormick as outpatient for Parkinson's disease treatment    Thank you for allowing me to participate in the care of your patient    Jesse Moon MD, PhD   [id number]

## 2024-02-14 NOTE — ED CDU PROVIDER INITIAL DAY NOTE - CLINICAL SUMMARY MEDICAL DECISION MAKING FREE TEXT BOX
Will obtain labs and repeat psychiatric consultation.    Patient has been medically cleared. Psychiatric consultation performed and patient will need inpatient admission for management of his persistent and significant delusions.

## 2024-02-14 NOTE — ED ADULT NURSE NOTE - HPI (INCLUDE ILLNESS QUALITY, SEVERITY, DURATION, TIMING, CONTEXT, MODIFYING FACTORS, ASSOCIATED SIGNS AND SYMPTOMS)
received pt in [facility] waned by security for contraband. pt is awake alert admits to feeling paranoid.  stating I feel like my wife wont let me leave the house.  she didn't try to stop me but I feel that way. pt also feels as if he was drugged when staff took blood work.  also stating he is afraid someone is going to say something about him.  pt denies s/h/i.  denies avh. admits to taking subox and Adderall when he got home which he got "on the street"

## 2024-02-14 NOTE — ED BEHAVIORAL HEALTH ASSESSMENT NOTE - CURRENT MEDICATION
Prescribed: carbidopa levodopa  mg qid, rasagiline 1 mg daily, pramipexole 0.5 mg bid    Street: Adderall 30mg and Suboxone ?mg

## 2024-02-14 NOTE — CONSULT NOTE ADULT - SUBJECTIVE AND OBJECTIVE BOX
[facility]                                     [facility] [place]                                 [address]                                  [address]                                        [place], [place], [address]                                             [phone]    CC: Parkinson's disease    HPI:  The patient is a 48y Male who presented with paranoid delusions, ha history of Parkinson's disease and follows with Dr Mccormick in [place]  He said he takes sinemet 25/100 one tab po qid, mirapex 0.5 mg bid and azilect 1 mg bid.  He says he was diagnosed based on history, exam and response to medication about 3-4 years ago.  He has masked facies and very mild right UE tremor, but otherwise not signifcantoy Parkinsonian.  Neurology is asked to evalate.    PAST MEDICAL & SURGICAL HISTORY:  H/O Parkinson's disease      MEDICATIONS  (STANDING):  carbidopa/levodopa  25/100 1 Tablet(s) Oral four times a day  pramipexole 0.5 milliGRAM(s) Oral two times a day  rasagiline Tablet 1 milliGRAM(s) Oral daily    MEDICATIONS  (PRN):      Allergies    No Known Allergies    Intolerances        SOCIAL HISTORY:  no tob,   no alcohol   no drugs    FAMILY HISTORY:  no Parkinson's disease      ROS: 14 point ROS negative other than what is present in HPI or below    Vital Signs Last 24 Hrs  T(C): 36.7 (14 Feb 2024 15:59), Max: 36.8 (14 Feb 2024 01:11)  T(F): 98 (14 Feb 2024 15:59), Max: 98.3 (14 Feb 2024 01:11)  HR: 92 (14 Feb 2024 15:59) (75 - 146)  BP: 145/77 (14 Feb 2024 15:59) (120/62 - 146/78)  BP(mean): --  RR: 18 (14 Feb 2024 15:59) (16 - 18)  SpO2: 97% (14 Feb 2024 15:59) (96% - 100%)    Parameters below as of 14 Feb 2024 15:59  Patient On (Oxygen Delivery Method): room air    General: NAD, masked facies    Detailed Neurologic Exam:    Mental status: The patient is awake and alert and has normal attention span.  The patient is fully oriented in 3 spheres. The patient is oriented to current events. The patient is able to name objects, follow commands, repeat sentences.    Cranial nerves: Pupils equal and react symmetrically to light. There is no visual field deficit to confrontation. Extraocular motion is full with no nystagmus. There is no ptosis. Facial sensation is intact. Facial musculature is symmetric. Palate elevates symmetrically. Tongue is midline.    Motor: There is normal bulk and slight increase tone right arm tone.  There is mild resting tremor on Right UEtremor.  Strength is 5/5 in the right arm and leg.   Strength is 5/5 in the left arm and leg.    Sensation: Intact to light touch and pin in 4 extremities    Reflexes: 2+ throughout and plantar responses are flexor.    Cerebellar: There is no dysmetria on finger to nose testing.    Gait : deferred    LABS:                         13.1   6.78  )-----------( 242      ( 14 Feb 2024 01:28 )             38.0       02-14    140  |  103  |  11.6  ----------------------------<  135<H>  3.0<L>   |  26.0  |  0.91    Ca    9.0      14 Feb 2024 01:28    TPro  6.7  /  Alb  4.2  /  TBili  0.4  /  DBili  x   /  AST  47<H>  /  ALT  <5  /  AlkPhos  52  02-14    RADIOLOGY & ADDITIONAL STUDIES (independently reviewed unless otherwise noted):  CT Head No Cont (02.13.24 @ 12:03)   Impression:  There is no acute intracranial hemorrhage, mass effect or midline shift.

## 2024-02-14 NOTE — ED PROVIDER NOTE - ATTENDING CONTRIBUTION TO CARE
47 yo male with history of parkinson's disease returns to the ED for evaluation after episode of anxiety/paranoia where he felt he needed to get away from his wife. Patient barricaded himself in his bedroom, smashed several things in an attempt to break his window and jump out. Police were able to michael the patient down. EMS called and patient transported. They state once in the ambulance the patient  was calm and appropriate. Patient recent ED visit is noted with comments of a fixed delusion.     I, Drake Lutz, personally saw the patient with the resident, and completed the key components of the history and physical exam. I then discussed the management plan with the resident.

## 2024-02-14 NOTE — ED ADULT NURSE NOTE - NSFALLHARMRISKINTERV_ED_ALL_ED
Communicate risk of Fall with Harm to all staff, patient, and family/Provide visual cue: red socks, yellow wristband, yellow gown, etc/Reinforce activity limits and safety measures with patient and family/Bed in lowest position, wheels locked, appropriate side rails in place/Non-slip footwear applied when patient is off stretcher/Physically safe environment - no spills, clutter or unnecessary equipment/Purposeful Proactive Rounding/Room/bathroom lighting operational, light cord in reach Communicate risk of Fall with Harm to all staff, patient, and family/Provide visual cue: red socks, yellow wristband, yellow gown, etc/Reinforce activity limits and safety measures with patient and family/Bed in lowest position, wheels locked, appropriate side rails in place/Call bell, personal items and telephone in reach/Instruct patient to call for assistance before getting out of bed/chair/stretcher/Non-slip footwear applied when patient is off stretcher/[place] to call system/Physically safe environment - no spills, clutter or unnecessary equipment/Purposeful Proactive Rounding/Room/bathroom lighting operational, light cord in reach

## 2024-02-14 NOTE — ED BEHAVIORAL HEALTH ASSESSMENT NOTE - RISK ASSESSMENT
Risk:  substance abuse, chronic medical illness, paranoia and fear someone is after him  Protective: educated, no prior psych history, no prior sa/nssib/violence  Given level of psychosis and lack of insight/judgement, is felt to be acutely dangerous

## 2024-02-14 NOTE — ED PROVIDER NOTE - CLINICAL SUMMARY MEDICAL DECISION MAKING FREE TEXT BOX
Will obtain labs and repeat psychiatric consultation. Will obtain labs and repeat psychiatric consultation.    Patient has been medically cleared. Psychiatric consultation performed and patient will need inpatient admission for mangement of his persistent and significant delusions.

## 2024-02-14 NOTE — CHART NOTE - NSCHARTNOTEFT_GEN_A_CORE
SW Note:  team recommended a neuro consult before completing dispo plans.  MD Dr. Mathur spoke with ED provider and requested neuro consult. SW aware pt might need IP psych and will follow to assist with dspo recommendations

## 2024-02-14 NOTE — ED ADULT TRIAGE NOTE - CHIEF COMPLAINT QUOTE
Patient brought in by [employer] stating he does not feel safe at home and that he's paranoid. As per [employer] patient locked himself in his bedroom.

## 2024-02-14 NOTE — ED PROVIDER NOTE - PHYSICAL EXAMINATION
Const: Pt is anxious appearing, looking around frequently. Awake, alert and oriented to person, place, & time.  HEENT: NC/AT. Moist mucous membranes.  Eyes: PERRLA. No scleral icterus. EOMI.  Neck:. Soft and supple. Full ROM without pain. No cervical midline tenderness.   Cardiac: Regular rate and regular rhythm. +S1/S2. Peripheral pulses 2+ and symmetric. No LE edema.  Resp: Speaking in full sentences, breath sounds equal and clear bilaterally. No wheezes, rales or rhonchi.  Abd: Soft, non-tender, non-distended. Normal bowel sounds in all 4 quadrants. No guarding or rebound.  MSK: Spine midline and non-tender. No CVAT.  Skin: dry blood on left and with no obvious alcerations or abrasions.   Neuro: Mild resting tremor in bilateral upper extremities   Psych: Anxious affect, occasional rapid speech. No verbalization of wish to harm self or others

## 2024-02-14 NOTE — ED BEHAVIORAL HEALTH ASSESSMENT NOTE - SUMMARY
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    Pt has poor insight and judgement. Given no PPH, likely this is sub induced psychosis vs psychosis due to PD meds.   Given impulsive behavior due to psychosis, is felt to be an acute danger to self/others and is not currently safe for d/c to community.

## 2024-02-14 NOTE — ED BEHAVIORAL HEALTH NOTE - BEHAVIORAL HEALTH NOTE
==================  PRE-HOSPITAL COURSE  ===================  SOURCE:  DANELLE Lomas and secondhand ED documentation  DETAILS: BIB PD for paranoid delusions  =========  ED COURSE  =========  SOURCE:  DANELLE Lomas and secondhand ED documentation.  ARRIVAL:  Per chart and RN, patient arrived via PD. Per RN, patient was calm upon arrival, and cooperative with triage process.  BELONGINGS:  Per RN, patient arrived with belongings. All belongings were provided to hospital security, and patient currently in a gown with a 1:1 staff member.  BEHAVIOR: RN described patient to be pacing but otherwise cooperative, presenting with linear thought process, AAOx3, presenting with anxious mood and congruent affect, remains in behavioral and impulse control, is not violent/aggressive. RN stated that the patient is not endorsing SI/HI/A/VH. RN stated that there are no visible marks, bruises, or lacerations on the body. RN stated that the patient appears to be well-groomed, maintains fair hygiene, and reports ambulates independently.   TREATMENT:  Per chart and RN, patient did not receive PRN medications.   VISITORS:  Per RN, no visitors at bedside.         COVID Exposure Screen- collateral (i.e. third-party, chart review, belongings, etc; include EMS and ED staff)   ---------------------------------------------------  1. Has the patient had a COVID-19 test in the last 90 days? Unknown.   2. Has the patient tested positive for COVID-19 antibodies? Unknown.   3. Has the patient received 2 doses of the COVID-19 vaccine? Unknown  4. In the past 10 days, has the patient been around anyone with a positive COVID-19 test?* Unknown.   5. Has the patient been out of [place] within the past 10 days? Unknown

## 2024-02-14 NOTE — ED PROVIDER NOTE - OBJECTIVE STATEMENT
A 48-year-old male patient with a history of Parkinson's disease, on levodopa carbidopa therapy presents to the ED complaining of psychiatric evaluation.  Patient was seen in the hospital 1 day ago secondary to Similar complaints of increased paranoia over the past few weeks.  Patient was deemed safe for discharge home with wife was comfortable being discharged home yesterday after psychiatric evaluation.  Patient states that after getting home he began to feel paranoid again, did not feel comfortable with his wife, locked himself in his bedroom and broke the window to "make some noise".  Patient then attempted to run out of the house for the window, at which time police were called and were able to find the patient.  Patient denies any suicidal homicidal ideations, auditory or visual hallucinations, illicit drug use, or alcohol use.  Patient denies any prior psychiatric history, history of anxiety, depression, schizophrenia.  No chest pain or shortness of breath.  No recent fever or chills.  No abdominal pain, nausea, vomiting or diarrhea.  No further complaints at this time

## 2024-02-14 NOTE — ED ADULT NURSE NOTE - DOES PATIENT HAVE ADVANCE DIRECTIVE
[facility]  [id number]  Phone: [phone]  Fax: [phone]    Maria Teresa Prajapati MD        January 21, 2021     Patient: Isabel Acevedo   YOB: 1955   Date of Visit: 1/21/2021       To Whom It May Concern: It is my medical opinion that Kevin Dejesus is handicapped due to degenerative disc disease of lumbar spine from 1/21/21 to 1/21/26 and requires a placard. .    If you have any questions or concerns, please don't hesitate to call.     Sincerely,          Maria Teresa Prajapati MD
No

## 2024-02-14 NOTE — ED BEHAVIORAL HEALTH ASSESSMENT NOTE - PSYCHIATRIC ISSUES AND PLAN (INCLUDE STANDING AND PRN MEDICATION)
Psychosis: Can offer Seroquel 25mg po q4hr prn anxiety.  Avoid Haldol.   If unable to take PO or emergent anxiolysis needed, can offer Ativan 1-2mg.

## 2024-02-15 LAB — SARS-COV-2 RNA SPEC QL NAA+PROBE: SIGNIFICANT CHANGE UP

## 2024-02-15 PROCEDURE — 99232 SBSQ HOSP IP/OBS MODERATE 35: CPT

## 2024-02-15 PROCEDURE — 99213 OFFICE O/P EST LOW 20 MIN: CPT

## 2024-02-15 RX ORDER — IBUPROFEN 200 MG
600 TABLET ORAL EVERY 6 HOURS
Refills: 0 | Status: DISCONTINUED | OUTPATIENT
Start: 2024-02-15 | End: 2024-02-21

## 2024-02-15 RX ORDER — IBUPROFEN 200 MG
600 TABLET ORAL ONCE
Refills: 0 | Status: COMPLETED | OUTPATIENT
Start: 2024-02-15 | End: 2024-02-15

## 2024-02-15 RX ORDER — OXYCODONE HYDROCHLORIDE 5 MG/1
5 TABLET ORAL ONCE
Refills: 0 | Status: DISCONTINUED | OUTPATIENT
Start: 2024-02-15 | End: 2024-02-15

## 2024-02-15 RX ORDER — QUETIAPINE FUMARATE 200 MG/1
25 TABLET, FILM COATED ORAL EVERY 8 HOURS
Refills: 0 | Status: DISCONTINUED | OUTPATIENT
Start: 2024-02-15 | End: 2024-02-21

## 2024-02-15 RX ORDER — POTASSIUM CHLORIDE 20 MEQ
40 PACKET (EA) ORAL ONCE
Refills: 0 | Status: COMPLETED | OUTPATIENT
Start: 2024-02-15 | End: 2024-02-15

## 2024-02-15 RX ADMIN — CARBIDOPA AND LEVODOPA 1 TABLET(S): 25; 100 TABLET ORAL at 17:54

## 2024-02-15 RX ADMIN — Medication 600 MILLIGRAM(S): at 12:19

## 2024-02-15 RX ADMIN — Medication 600 MILLIGRAM(S): at 11:37

## 2024-02-15 RX ADMIN — Medication 600 MILLIGRAM(S): at 06:30

## 2024-02-15 RX ADMIN — Medication 1 MILLIGRAM(S): at 21:04

## 2024-02-15 RX ADMIN — Medication 40 MILLIEQUIVALENT(S): at 11:36

## 2024-02-15 RX ADMIN — PRAMIPEXOLE DIHYDROCHLORIDE 0.5 MILLIGRAM(S): 0.12 TABLET ORAL at 17:51

## 2024-02-15 RX ADMIN — OXYCODONE HYDROCHLORIDE 5 MILLIGRAM(S): 5 TABLET ORAL at 17:50

## 2024-02-15 RX ADMIN — PRAMIPEXOLE DIHYDROCHLORIDE 0.5 MILLIGRAM(S): 0.12 TABLET ORAL at 06:28

## 2024-02-15 RX ADMIN — CARBIDOPA AND LEVODOPA 1 TABLET(S): 25; 100 TABLET ORAL at 11:36

## 2024-02-15 RX ADMIN — RASAGILINE 1 MILLIGRAM(S): 0.5 TABLET ORAL at 11:36

## 2024-02-15 RX ADMIN — Medication 600 MILLIGRAM(S): at 05:52

## 2024-02-15 RX ADMIN — CARBIDOPA AND LEVODOPA 1 TABLET(S): 25; 100 TABLET ORAL at 06:27

## 2024-02-15 RX ADMIN — Medication 600 MILLIGRAM(S): at 17:51

## 2024-02-15 NOTE — ED BEHAVIORAL HEALTH PROGRESS NOTE - PSYCHIATRIC ISSUES AND PLAN (INCLUDE STANDING AND PRN MEDICATION)
defer to receiving facility  may use quetiapine prn
declines psychotropics at this time (has been maintaining behavioral control); does not require constant observation as patient located in behavioral health unit at time of this documentation, furthermore denies any active suicidal/homicidal ideations and has been maintaining behavioral control (will defer to primary team should circumstances change or if patient may benefit from observation for assisted reasons)

## 2024-02-15 NOTE — ED BEHAVIORAL HEALTH PROGRESS NOTE - STANDING MEDS RECEIVED IN ED DETAILS FREE TEXT
MEDICATIONS  (STANDING):  carbidopa/levodopa  25/100 1 Tablet(s) Oral four times a day  potassium chloride   Powder 40 milliEquivalent(s) Oral once  pramipexole 0.5 milliGRAM(s) Oral two times a day  rasagiline Tablet 1 milliGRAM(s) Oral daily

## 2024-02-15 NOTE — CHART NOTE - NSCHARTNOTEFT_GEN_A_CORE
SW Note: Plan is to transfer pt for IP psychiatric care. Neuro consult completed and pt cleared for transfer. Labs completed and covid is neg. legal status will be 9.27. Pt is listed as self pay. Spoke with pts spouse Faustina [phone]. She provided info for possible coverage thru [employer] but the business office ran info and policy is termed. They are going to follow pt for medicaid aw[place]. Pts spouse aware of the possible transfer options if a bed is available @ [facility], [facility], [facility], [facility] and . Currently there are no beds available.

## 2024-02-15 NOTE — ED BEHAVIORAL HEALTH PROGRESS NOTE - SUMMARY
48 year old man  domiciled hs educated non caregiver employed though applying for disability no formal psychiatric history (no mental health diagnoses no prior inpatient psychiatric hospitalizations no outpatient mental health follow up no prior suicide attempts or non suicidal self injurious behavior no instances of violence aggression violation of orders of protection), [facility] parkinsons who was brought in by ems after police were called to his house for PI including locking self in bedroom and breaking window to flee when police were at his door to flee.    Pt has poor insight and judgement. Given no PPH, likely this is sub induced psychosis vs psychosis due to PD meds.   Given impulsive behavior due to psychosis, is felt to be an acute danger to self/others and is not currently safe for d/c to community.
cooperative Observed pacing sitting on floor

## 2024-02-15 NOTE — ED BEHAVIORAL HEALTH PROGRESS NOTE - CASE SUMMARY/FORMULATION (CLEARLY DOCUMENT RATIONALE FOR DISPOSITION CHANGE)
48 yr old with acute paranoid psychosis Deemed in need of inpatient care due to erratic beahviors
Given impulsive behavior due to psychosis, is felt to be an acute danger to self/others and is not currently safe for d/c to community.  Would benefit from inpatient psychiatric admission for assurance of safety, further stabilization, medication management, and diagnostic clarification.

## 2024-02-15 NOTE — ED BEHAVIORAL HEALTH PROGRESS NOTE - RISK ASSESSMENT
no interval changes
Risk:  substance abuse, chronic medical illness, paranoia and fear someone is after him  Protective: educated, no prior psych history, no prior sa/nssib/violence  Given level of psychosis and lack of insight/judgement, is felt to be acutely dangerous

## 2024-02-15 NOTE — ED BEHAVIORAL HEALTH PROGRESS NOTE - DETAILS:
discussed suspiciousness paranoia leading to repeated ED visit Admits to use of street Suboxone for back pain and Adderall In addition admits to frequent misuse of his parkinson's medications
Evaluated for follow up. Reports feeling the same. Ongoing paranoia. Does not feel safe. Did not wish to speak extensively with writer today.

## 2024-02-16 ENCOUNTER — INPATIENT (INPATIENT)
Facility: HOSPITAL | Age: 49
LOS: 10 days | Discharge: HOME | End: 2024-02-27
Attending: PSYCHIATRY & NEUROLOGY | Admitting: PSYCHIATRY & NEUROLOGY
Payer: MEDICAID

## 2024-02-16 VITALS — TEMPERATURE: 99 F | OXYGEN SATURATION: 98 % | RESPIRATION RATE: 16 BRPM | HEIGHT: 65 IN | WEIGHT: 167.55 LBS

## 2024-02-16 VITALS
DIASTOLIC BLOOD PRESSURE: 84 MMHG | RESPIRATION RATE: 19 BRPM | OXYGEN SATURATION: 98 % | SYSTOLIC BLOOD PRESSURE: 123 MMHG | TEMPERATURE: 98 F | HEART RATE: 78 BPM

## 2024-02-16 DIAGNOSIS — F33.9 MAJOR DEPRESSIVE DISORDER, RECURRENT, UNSPECIFIED: ICD-10-CM

## 2024-02-16 PROCEDURE — 99233 SBSQ HOSP IP/OBS HIGH 50: CPT

## 2024-02-16 PROCEDURE — 99223 1ST HOSP IP/OBS HIGH 75: CPT

## 2024-02-16 PROCEDURE — 87635 SARS-COV-2 COVID-19 AMP PRB: CPT

## 2024-02-16 PROCEDURE — 36415 COLL VENOUS BLD VENIPUNCTURE: CPT

## 2024-02-16 PROCEDURE — 93005 ELECTROCARDIOGRAM TRACING: CPT

## 2024-02-16 PROCEDURE — 81001 URINALYSIS AUTO W/SCOPE: CPT

## 2024-02-16 PROCEDURE — G0378: CPT

## 2024-02-16 PROCEDURE — 80307 DRUG TEST PRSMV CHEM ANLYZR: CPT

## 2024-02-16 PROCEDURE — 99285 EMERGENCY DEPT VISIT HI MDM: CPT

## 2024-02-16 PROCEDURE — 84443 ASSAY THYROID STIM HORMONE: CPT

## 2024-02-16 PROCEDURE — 80053 COMPREHEN METABOLIC PANEL: CPT

## 2024-02-16 PROCEDURE — 85025 COMPLETE CBC W/AUTO DIFF WBC: CPT

## 2024-02-16 RX ORDER — INFLUENZA VIRUS VACCINE 15; 15; 15; 15 UG/.5ML; UG/.5ML; UG/.5ML; UG/.5ML
0.5 SUSPENSION INTRAMUSCULAR ONCE
Refills: 0 | Status: COMPLETED | OUTPATIENT
Start: 2024-02-16 | End: 2024-02-16

## 2024-02-16 RX ORDER — PRAMIPEXOLE DIHYDROCHLORIDE 0.12 MG/1
0.5 TABLET ORAL
Refills: 0 | Status: DISCONTINUED | OUTPATIENT
Start: 2024-02-16 | End: 2024-02-27

## 2024-02-16 RX ORDER — GABAPENTIN 400 MG/1
300 CAPSULE ORAL
Refills: 0 | Status: DISCONTINUED | OUTPATIENT
Start: 2024-02-16 | End: 2024-02-17

## 2024-02-16 RX ORDER — CARBIDOPA AND LEVODOPA 25; 100 MG/1; MG/1
1 TABLET ORAL ONCE
Refills: 0 | Status: COMPLETED | OUTPATIENT
Start: 2024-02-16 | End: 2024-02-16

## 2024-02-16 RX ORDER — KETOROLAC TROMETHAMINE 30 MG/ML
15 SYRINGE (ML) INJECTION
Refills: 0 | Status: DISCONTINUED | OUTPATIENT
Start: 2024-02-16 | End: 2024-02-18

## 2024-02-16 RX ORDER — KETOROLAC TROMETHAMINE 30 MG/ML
10 SYRINGE (ML) INJECTION EVERY 6 HOURS
Refills: 0 | Status: DISCONTINUED | OUTPATIENT
Start: 2024-02-16 | End: 2024-02-21

## 2024-02-16 RX ORDER — QUETIAPINE FUMARATE 200 MG/1
100 TABLET, FILM COATED ORAL AT BEDTIME
Refills: 0 | Status: DISCONTINUED | OUTPATIENT
Start: 2024-02-16 | End: 2024-02-18

## 2024-02-16 RX ORDER — CARBIDOPA AND LEVODOPA 25; 100 MG/1; MG/1
1 TABLET ORAL
Refills: 0 | Status: DISCONTINUED | OUTPATIENT
Start: 2024-02-16 | End: 2024-02-17

## 2024-02-16 RX ORDER — QUETIAPINE FUMARATE 200 MG/1
25 TABLET, FILM COATED ORAL EVERY 4 HOURS
Refills: 0 | Status: DISCONTINUED | OUTPATIENT
Start: 2024-02-16 | End: 2024-02-21

## 2024-02-16 RX ORDER — QUETIAPINE FUMARATE 200 MG/1
100 TABLET, FILM COATED ORAL EVERY 4 HOURS
Refills: 0 | Status: DISCONTINUED | OUTPATIENT
Start: 2024-02-16 | End: 2024-02-21

## 2024-02-16 RX ORDER — LIDOCAINE 4 G/100G
1 CREAM TOPICAL DAILY
Refills: 0 | Status: DISCONTINUED | OUTPATIENT
Start: 2024-02-16 | End: 2024-02-27

## 2024-02-16 RX ORDER — RASAGILINE 0.5 MG/1
1 TABLET ORAL DAILY
Refills: 0 | Status: DISCONTINUED | OUTPATIENT
Start: 2024-02-16 | End: 2024-02-27

## 2024-02-16 RX ADMIN — PRAMIPEXOLE DIHYDROCHLORIDE 0.5 MILLIGRAM(S): 0.12 TABLET ORAL at 06:05

## 2024-02-16 RX ADMIN — CARBIDOPA AND LEVODOPA 1 TABLET(S): 25; 100 TABLET ORAL at 15:27

## 2024-02-16 RX ADMIN — GABAPENTIN 300 MILLIGRAM(S): 400 CAPSULE ORAL at 19:48

## 2024-02-16 RX ADMIN — CARBIDOPA AND LEVODOPA 1 TABLET(S): 25; 100 TABLET ORAL at 12:37

## 2024-02-16 RX ADMIN — PRAMIPEXOLE DIHYDROCHLORIDE 0.5 MILLIGRAM(S): 0.12 TABLET ORAL at 20:51

## 2024-02-16 RX ADMIN — CARBIDOPA AND LEVODOPA 1 TABLET(S): 25; 100 TABLET ORAL at 19:20

## 2024-02-16 RX ADMIN — QUETIAPINE FUMARATE 100 MILLIGRAM(S): 200 TABLET, FILM COATED ORAL at 20:51

## 2024-02-16 RX ADMIN — Medication 10 MILLIGRAM(S): at 17:47

## 2024-02-16 RX ADMIN — Medication 15 MILLIGRAM(S): at 23:05

## 2024-02-16 RX ADMIN — CARBIDOPA AND LEVODOPA 1 TABLET(S): 25; 100 TABLET ORAL at 06:04

## 2024-02-16 RX ADMIN — Medication 15 MILLIGRAM(S): at 12:42

## 2024-02-16 RX ADMIN — RASAGILINE 1 MILLIGRAM(S): 0.5 TABLET ORAL at 17:46

## 2024-02-16 RX ADMIN — Medication 10 MILLIGRAM(S): at 18:53

## 2024-02-16 RX ADMIN — CARBIDOPA AND LEVODOPA 1 TABLET(S): 25; 100 TABLET ORAL at 00:05

## 2024-02-16 NOTE — ED CDU PROVIDER SUBSEQUENT DAY NOTE - CLINICAL SUMMARY MEDICAL DECISION MAKING FREE TEXT BOX
Patient has been medically cleared. Psychiatric consultation performed and patient will need inpatient admission for management of his persistent and significant delusions. Cleared by neurology for inpatient psych admission. Pending bed availability.
Patient has been medically cleared. Psychiatric consultation performed and patient will need inpatient admission for management of his persistent and significant delusions.

## 2024-02-16 NOTE — BH PATIENT PROFILE - FALL HARM RISK - HARM RISK INTERVENTIONS

## 2024-02-16 NOTE — BH SOCIAL WORK INITIAL PSYCHOSOCIAL EVALUATION - OTHER PAST PSYCHIATRIC HISTORY (INCLUDE DETAILS REGARDING ONSET, COURSE OF ILLNESS, INPATIENT/OUTPATIENT TREATMENT)
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  As per EMR patient was in normal state during the morning and was supposed to go to grocery store however ended up at courts stating he had a problem at home and referred by officials to emergency department for evaluation.     Writer met with pt on the unit who was restless and constantly shifting positions because of his pain. He was a bit pressured and at one point laid on the stretcher in the room asking to sleep there instead of his bed because it was more comfortable. Rated his pain 5/10. Stated he was abusing adderall, up to 60 mg a day, and admitted that he was paranoid prior to admission. States he no longer feels this way. Denies SI/HI, denies AVH.

## 2024-02-16 NOTE — BH INPATIENT PSYCHIATRY ASSESSMENT NOTE - HPI (INCLUDE ILLNESS QUALITY, SEVERITY, DURATION, TIMING, CONTEXT, MODIFYING FACTORS, ASSOCIATED SIGNS AND SYMPTOMS)
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    Pt was in ER 2/13 allegedly for AMS.  Had left home to go to store but ended up at courts as he knew police would be there that could protect him.  Patient was noted to be a poor historian, awake and aware of himself and surroundings however selective and responses.  Made vague remarks about being concerned about his son's safety and his wife being "manipulative" and possibly narcissistic.  Denies any acute or significant psychiatric issues concerns or complaints. Stable mood with no anhedonia or other depressive symptoms. Does not appear overtly manic. Denies any perceptual disturbances though may have ?chronic ?paranoid delusions. On interview now, pt somewhat disorganized saying he went mesha but was worried wife would be mad at him which made him more paranoid for past couple days.   He isn't sure how  got there but then they knocked on his bedroom door, he wasn't sure they were really police or people trying to hurt him so instead of opening door, broke window and ran out as he saw police in his yard in uniform that "could protect me".  Pt not able to explain why he needs protection, answering with "you see, it's like this, I don't want to answer as I worry the information will be used against me".    Does admit he has had more discord with wife recently but can not explain about what.    Does admit he took an extra Sinamet yesterday (5 instead of 4) and take Suboxone and Adderall he gets from "the community.   States Suboxone is for pain and does not answer what Adderall is for.   Makes reference to this being something that would upset wife if "misrepresented".      He has parkinsons (for which he's prescribed carbidopa levodopa  mg qid rasagiline 1 mg daily pramipexole 0.5 mg bid - confirmed through [facility]). At times overuses his medications, previously taking up to 6 tablets of carbidopa levodopa during a day.  Denies any suicidal intention; attempts at self medicating his parkinsons.  Unsure of suboxone dose though adderall typically 30 mg daily. Last used both earlier today. No alcohol or tobacco use.  Utox only pos for stimulants.  Had neg NCHCT earlier today.    Earlier today collateral information obtained from wife Faustina [phone] who states that he's been delusional and irritable since the summer. aware that he misuses his parkinsons medication as well as suspecting medications not prescribed to him as has a history of substance abuse (vicodin/opioids).     At end of interview, pt continues to say he does not feel safe and requests police in uniform come to watch him as he feels he can't trust anyone else.   Discussed with pt medication to help with anxiety (eg. Seroquel) but pt denies to accept meds voluntarily.    On ML6, does not appear frankly psychotic, and when asked about events at home, does remember them, but feels these perceptions have subsided. Immediate pain complaints but does not appear in distress, says back pain and sciatica, requests oxy. Writer declined, explained very unlikely given hx. 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    Pt was in ER 2/13 allegedly for AMS.  Had left home to go to store but ended up at courts as he knew police would be there that could protect him.  Patient was noted to be a poor historian, awake and aware of himself and surroundings however selective and responses.  Made vague remarks about being concerned about his son's safety and his wife being "manipulative" and possibly narcissistic.  Denies any acute or significant psychiatric issues concerns or complaints. Stable mood with no anhedonia or other depressive symptoms. Does not appear overtly manic. Denies any perceptual disturbances though may have ?chronic ?paranoid delusions. On interview now, pt somewhat disorganized saying he went mesha but was worried wife would be mad at him which made him more paranoid for past couple days.   He isn't sure how  got there but then they knocked on his bedroom door, he wasn't sure they were really police or people trying to hurt him so instead of opening door, broke window and ran out as he saw police in his yard in uniform that "could protect me".  Pt not able to explain why he needs protection, answering with "you see, it's like this, I don't want to answer as I worry the information will be used against me".    Does admit he has had more discord with wife recently but can not explain about what.    Does admit he took an extra Sinamet yesterday (5 instead of 4) and take Suboxone and Adderall he gets from "the community.   States Suboxone is for pain and does not answer what Adderall is for.   Makes reference to this being something that would upset wife if "misrepresented".      He has parkinsons (for which he's prescribed carbidopa levodopa  mg qid rasagiline 1 mg daily pramipexole 0.5 mg bid - [facility]). At times overuses his medications, previously taking up to 6 tablets of carbidopa levodopa during a day.  Denies any suicidal intention; attempts at self medicating his parkinsons.  Unsure of suboxone dose though adderall typically 30 mg daily. Last used both earlier today. No alcohol or tobacco use.  Utox only pos for stimulants.  Had neg NCHCT earlier today.    Earlier today collateral information obtained from wife Faustina [phone] who states that he's been delusional and irritable since the summer. aware that he misuses his parkinsons medication as well as suspecting medications not prescribed to him as has a history of substance abuse (vicodin/opioids).     At end of interview, pt continues to say he does not feel safe and requests police in uniform come to watch him as he feels he can't trust anyone else.   Discussed with pt medication to help with anxiety (eg. Seroquel) but pt denies to accept meds voluntarily.    On ML6, does not appear frankly psychotic, and when asked about events at home, does remember them, but feels these perceptions have subsided. Immediate pain complaints but does not appear in distress, says back pain and sciatica, requests oxy. Writer declined, explained very unlikely given hx. Suggested options of pain mgmt with SNRIs vs gabapentin vs ketorolac, and offered to f/u with NEURO. Pt appreciative and we made plan, continued all home meds for PD. Pt reports he has problems with addiction, feels he needs help, and feels that he can work with team. Disabled by PD symptoms at times, but unclear on degree of misuse of sinemet that has contributed to this. Several sessions today to review meds and medical issues, and thanks MD for attention. Introducted pt to RN so that he is able to ask for help of RN. Denies erlin plan or wish to harm self on unit. Reports he lives with wife, but does not mention any aspects of relationhip.

## 2024-02-16 NOTE — CHART NOTE - NSCHARTNOTEFT_GEN_A_CORE
Screening Medical Evaluation    Patient Admitted from: [facility] ED     [facility] admitting diagnosis: Recurrent major depressive disorder      PAST MEDICAL & SURGICAL HISTORY:  H/O Parkinson's disease            Allergies    No Known Allergies    Intolerances          Social History:       FAMILY HISTORY:        MEDICATIONS  (STANDING):  carbidopa/levodopa  25/100 1 Tablet(s) Oral <User Schedule>  influenza   Vaccine 0.5 milliLiter(s) IntraMuscular once  pramipexole 0.5 milliGRAM(s) Oral two times a day  QUEtiapine 100 milliGRAM(s) Oral at bedtime  rasagiline Tablet 1 milliGRAM(s) Oral daily    MEDICATIONS  (PRN):  gabapentin 300 milliGRAM(s) Oral four times a day PRN neuropathic  ketorolac 10 milliGRAM(s) Oral every 6 hours PRN pain  ketorolac   Injectable 15 milliGRAM(s) IntraMuscular two times a day PRN back pain/sciatica  QUEtiapine 25 milliGRAM(s) Oral every 4 hours PRN severe anxiety or agitation or aggression  QUEtiapine 100 milliGRAM(s) Oral every 4 hours PRN severe agitation/aggression        Vital Signs Last 24 Hrs  T(C): 37.1 (16 Feb 2024 10:51), Max: 37.1 (16 Feb 2024 10:51)  T(F): 98.7 (16 Feb 2024 10:51), Max: 98.7 (16 Feb 2024 10:51)  HR: 78 (16 Feb 2024 06:51) (63 - 92)  BP: 123/84 (16 Feb 2024 06:51) (123/71 - 134/87)  BP(mean): --  RR: 16 (16 Feb 2024 10:51) (16 - 19)  SpO2: 98% (16 Feb 2024 10:51) (96% - 98%)    Parameters below as of 16 Feb 2024 10:51  Patient On (Oxygen Delivery Method): room air      CAPILLARY BLOOD GLUCOSE            PHYSICAL EXAM:  GENERAL: NAD.   HEAD:  Atraumatic, Normocephalic  EYES: EOMI, PERRLA, conjunctiva and sclera clear  NECK: Supple, No JVD  CHEST/LUNG: Clear to auscultation bilaterally; No wheeze  HEART: Regular rate and rhythm; No murmurs, rubs, or gallops  ABDOMEN: Positive BS, Soft, Nontender.   EXTREMITIES:  2+ Peripheral Pulses, No clubbing, cyanosis, or edema  PSYCH: Calm and cooperative.   NEUROLOGY: slight resting tremor.   SKIN: No rashes or lesions seen on exposed skin.     LABS:                    RADIOLOGY & ADDITIONAL TESTS:      Assessment and Plan:  48M admitted to [facility] for Recurrent major depressive disorder. PMHx of Parkinson's disease. Pt seen for medical screening evaluation. Patient has no acute complaints at this time. Patient denies fever, chills, headache, dizziness, lightheadedness, N/V, SOB, cough, congestion, chest pain, abdominal pain, dysuria, hematuria, diarrhea, constipation. Physical exam unremarkable, VSS. Labs pending. 2/14 EKG ST @ 119b/ min, QT/ QTC= 340/ 478.    1.) Parkinson's disease: Continue carbidopa/levodopa, rasagiline & pramipexole. Fall precautions and PT eval for gait assessment.   2.) Recurrent major depressive disorder: Plan per primary team.

## 2024-02-16 NOTE — ED CDU PROVIDER SUBSEQUENT DAY NOTE - NSICDXPASTMEDICALHX_GEN_ALL_CORE_FT
PAST MEDICAL HISTORY:  H/O Parkinson's disease     
PAST MEDICAL HISTORY:  H/O Parkinson's disease

## 2024-02-16 NOTE — BH INPATIENT PSYCHIATRY ASSESSMENT NOTE - NSBHASSESSSUMMFT_PSY_ALL_CORE
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    PLAN  2PC admit  Q15 adequate  PT consult  Psych:  start seroquel 100 qhs  continue rasagiline 1 qd  Comorbids:  continue carbi/levo 25/100  continue pramipexole 0.5 BID  PRNs: seroquel 12.5 and 100 PO PRNs  PAIN MGMT: ketorolac 15 IM x10 doses PRN max over 5 days as BID  PO 10 mg q6 ketorolac  neurontin 300 QID PRN for back pain  Family collateral  NEURO collateral  NEURO consult initiated  G&M therapy  Supportive therapy as tolerated  DISPO TBD

## 2024-02-16 NOTE — BH INPATIENT PSYCHIATRY ASSESSMENT NOTE - NSBHCHARTREVIEWVS_PSY_A_CORE FT
Vital Signs Last 24 Hrs  T(C): 37.1 (02-16-24 @ 10:51), Max: 37.1 (02-16-24 @ 10:51)  T(F): 98.7 (02-16-24 @ 10:51), Max: 98.7 (02-16-24 @ 10:51)  HR: 78 (02-16-24 @ 06:51) (63 - 92)  BP: 123/84 (02-16-24 @ 06:51) (123/71 - 134/87)  BP(mean): --  RR: 16 (02-16-24 @ 10:51) (16 - 19)  SpO2: 98% (02-16-24 @ 10:51) (96% - 98%)    Orthostatic VS  02-16-24 @ 10:51  Lying BP: --/-- HR: --  Sitting BP: 135/95 HR: 81  Standing BP: 131/97 HR: 98  Site: upper left arm  Mode: --   Vital Signs Last 24 Hrs  T(C): 37.1 (02-16-24 @ 10:51), Max: 37.1 (02-16-24 @ 10:51)  T(F): 98.7 (02-16-24 @ 10:51), Max: 98.7 (02-16-24 @ 10:51)  HR: 78 (02-16-24 @ 06:51) (63 - 78)  BP: 123/84 (02-16-24 @ 06:51) (123/71 - 123/84)  BP(mean): --  RR: 16 (02-16-24 @ 10:51) (16 - 19)  SpO2: 98% (02-16-24 @ 10:51) (96% - 98%)    Orthostatic VS  02-16-24 @ 10:51  Lying BP: --/-- HR: --  Sitting BP: 135/95 HR: 81  Standing BP: 131/97 HR: 98  Site: upper left arm  Mode: --

## 2024-02-16 NOTE — PSYCHIATRIC REHAB INITIAL EVALUATION - NSBHPRRECOMMEND_PSY_ALL_CORE
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 Patient has been experiencing chronic pain due to his Parkinson's, and reportes that he often takes more medication than prescribed, and buys Adderall and Suboxone from the street. Patient was reported by family to have been "delusional and irritable" since the summer. Patient has been calm and cooperative on the unit. Patient has no hx of aggression. Patient has a hx of opiod abuse. Psych rehab staff will continue to work with patient on developing effective coping skills.

## 2024-02-16 NOTE — BH INPATIENT PSYCHIATRY ASSESSMENT NOTE - CURRENT MEDICATION
MEDICATIONS  (STANDING):  carbidopa/levodopa  25/100 1 Tablet(s) Oral <User Schedule>  influenza   Vaccine 0.5 milliLiter(s) IntraMuscular once  pramipexole 0.5 milliGRAM(s) Oral two times a day  QUEtiapine 100 milliGRAM(s) Oral at bedtime  rasagiline Tablet 1 milliGRAM(s) Oral daily    MEDICATIONS  (PRN):  gabapentin 300 milliGRAM(s) Oral four times a day PRN neuropathic  ketorolac 10 milliGRAM(s) Oral every 6 hours PRN pain  ketorolac   Injectable 15 milliGRAM(s) IntraMuscular two times a day PRN back pain/sciatica  QUEtiapine 25 milliGRAM(s) Oral every 4 hours PRN severe anxiety or agitation or aggression  QUEtiapine 100 milliGRAM(s) Oral every 4 hours PRN severe agitation/aggression

## 2024-02-16 NOTE — BH INPATIENT PSYCHIATRY ASSESSMENT NOTE - VIOLENCE RISK FACTORS:
Substance abuse Substance abuse/Affective dysregulation/Impulsivity/Noncompliance with treatment/Irritability

## 2024-02-16 NOTE — ED BEHAVIORAL HEALTH NOTE - BEHAVIORAL HEALTH NOTE
Presented patient to [facility] at [facility], was told that there was potentially a bed on low6, packet faxed to [facility], [facility] paged requesting call to present patient.

## 2024-02-16 NOTE — BH INPATIENT PSYCHIATRY ASSESSMENT NOTE - NSTXDISORGINTERMD_PSY_ALL_CORE
PSychopharm with supportive therapy and MI for substance use with addiction tx plan with optiomal PD mgmt and appropriate aftercare

## 2024-02-16 NOTE — BH INPATIENT PSYCHIATRY ASSESSMENT NOTE - NSSUICPROTFACT_PSY_ALL_CORE
Responsibility to children, family, or others/Identifies reasons for living Responsibility to children, family, or others/Identifies reasons for living/Supportive social network of family or friends

## 2024-02-16 NOTE — BH SOCIAL WORK INITIAL PSYCHOSOCIAL EVALUATION - NSBHHOUSESPA_PSY_ALL_CORE
Pt states pain to left knee improved rating it 3 on scale of 1 to 10.  Pt tolerated po intake with no n/v.  Pt voided x1 prior to discharge home with no problems.  Discharge instructions reviewed with the patient and friend - verbalize understanding.  Dressing to Left knee = D&I with no drainage noted.  Ice Man pack in place.  Ice Man machine and crutches given for home use.  Wheeled the patient to the car.   No

## 2024-02-16 NOTE — ED ADULT NURSE REASSESSMENT NOTE - NS ED NURSE REASSESS COMMENT FT1
Assumed care of patient.  Pt alert and cooperative.  Pt lying in darken room resting with door closed. Pt denies any complaints at present time.  Po fluids given.  Will monitor and chart changes and maintain safe environment
Patient noted pacing the unit since arrival. He is calm in appearance, talking to phone with family at time. He is alert and oriented. Patient continues to refuse medication. His potassium is 3.0, heart rate 117. Potassium supplement was offered to patient, he did refused. Level was reviewed with main ED attending with the notice that the patient refused the supplement. Patient request a 1:1 nurse to sit and talk with him. Unit protocols were reviewed with patient, he verbalized understanding.
Patient received Motrin 600mg for generalized pain complaint and reported it resolved on reassessment.  Patient reports he feels he would do better if he just went home but is agreement with transfer to inpatient psychiatric unit.  No attempts to harm self or others.  Provided supplies and showered independently.  No attempts to harm self or others.  Safety of patient maintained.
Patient was able to sleep the night. He received his AM medications, tolerated well. Continues to walk with unsteady gait, no assistance needed. Patient is schedule to be transferred to [facility] this AM awaiting for ambulance. Report was given to MD Hao Cortez is the receiving MD, patient is going to [facility] unit. No concerns for safety.
Patient was discharged to [facility] in stable condition. All belongings were returned to him, patient spoke to wife before leaving the unit.
Patient was interviewed by tele psych, was offered Seroquel for paranoia symptoms, patient refused. Plan is to hold Patient  for reassessment.
Received report, assumed patient care at 7:00 PM. Patient in bed , denied visual and auditory hallucinations, denied suicidal thoughts. Plan is to transfer patient for treatment, no attempt to harm self and others, safety maintained.
Tele psych called Bed available at [facility] for patient, report given to nurse Zaragoza, receiving MD Name is Dr Bui. nursing will continue to follow up with transfer.
c/o back pain 6/10 dr made aware and ibuprofen 600mg given. pt reports partial relief 3/10
dr Moon was at bedside to perform neuro exam. pt tolerated well.
patient given am medsa and tolerated will. Pt states feeling slightly better after motrin.  Will monitor and chart changes
patient states having back pain and requesting motrin.  Dr. Whitaker made aware ordered and given.
pt awake and alert pacing around the common area. pt requested a copy of the "Patient's Bill of Rights" and given a copy. pt states "there is something going on here" exhibiting paranoid behavior referring to the sinks in the common area working during the day but not at night. pt educated that the faucets are controlled by motion sensors only. pt also endorses that no is telling him the truth. pt has made no attempts to harm himself or others.
pt is a&o x4 alternating sitting in his room and walking around the common area. pt appears less paranoid interacting appropriately with staff and others pt's. pt reports feeling very uncomfortable physically due to the mattress. pt requested if he could use brought from home [facility] [place] for pain relief. Provider to RN order obtained. pt has been cooperative and compliant with staffs requests. pt educated about plan of care to transfer for inpatient tx. pt was able to successfully teach back plan. Pt has made no attempt's to harm himself or others.
pt is restless c/o lower back pain that radiates down his right leg. pt rates his pain 7/10 at this time. Dr Martinez ed attending made aware of pt's complaint.
pt reports improved pain 1/10 but reports he is anxious and unable to sleep. Psych provider made aware and pt was administered Ativan 1mg po. pt is now in his room resting/sleeping. no attempts to harm self or others.
pt undressed and placed in yellow gown pt noted to have cash. cash counted in front of patient with 2 nurses and security and pt refused to sign paper work to secure cash. 471 dollars placed in envelope 9-50s, 1-10,     11-1s  cash given to security
received pt resting/sleeping offering no complaints at this time. pt provided breakfast which he consumed 100%. pt has made no attempts to harm himself or others. pt was evaluated by psychiatry and disposition to be admitted invol inpatient. pt educated about plan and was able to successfully teach back.
Assumed care of patient at 07:15.  Patient resting in bed appears to be sleeping with no distress and regular nonlabored breathing noted.  Safety of patient maintained.
resting in his room in no acute distress. pt provided the telephone to call his wife. pt compliant with medications dispensed. no attempts to harm self or others.

## 2024-02-16 NOTE — ED CDU PROVIDER DISPOSITION NOTE - CLINICAL COURSE
48y M w/ hx Parkinson's, presented with paranoid delusions. Seen by psych, advising involuntary admission for psychosis. Medically cleared. Neurology also clearing pt for inpatient psych admission. Pt stable throughout ED stay. Accepted to [facility].

## 2024-02-16 NOTE — BH INPATIENT PSYCHIATRY ASSESSMENT NOTE - NSBHATTESTBILLING_PSY_A_CORE
98566-Auohxmyyngt diagnostic evaluation with medical services 99223-Initial OBS or IP - high complexity OR  mins

## 2024-02-16 NOTE — BH INPATIENT PSYCHIATRY ASSESSMENT NOTE - NSBHSARX_PSY_A_CORE FT
non prescribed suboxone and adderall non prescribed suboxone and adderall misuse with sinemet overuse

## 2024-02-16 NOTE — ED CDU PROVIDER SUBSEQUENT DAY NOTE - PHYSICAL EXAMINATION
Const: Pt is anxious appearing, looking around frequently. Awake, alert and oriented to person, place, & time.  HEENT: NC/AT. Moist mucous membranes.  Eyes: PERRLA. No scleral icterus. EOMI.  Neck:. Soft and supple. Full ROM without pain. No cervical midline tenderness.   Cardiac: Regular rate and regular rhythm. +S1/S2. Peripheral pulses 2+ and symmetric. No LE edema.  Resp: Speaking in full sentences, breath sounds equal and clear bilaterally. No wheezes, rales or rhonchi.  Abd: Soft, non-tender, non-distended. Normal bowel sounds in all 4 quadrants. No guarding or rebound.  MSK: Spine midline and non-tender. No CVAT.  Skin: dry blood on left and with no obvious alcerations or abrasions.   Neuro: Mild resting tremor in bilateral upper extremities   Psych: Anxious affect, occasional rapid speech. No verbalization of wish to harm self or others
Const: Pt is anxious appearing, looking around frequently. Awake, alert and oriented to person, place, & time.  HEENT: NC/AT. Moist mucous membranes.  Eyes: PERRLA. No scleral icterus. EOMI.  Neck:. Soft and supple. Full ROM without pain. No cervical midline tenderness.   Cardiac: Regular rate and regular rhythm. +S1/S2. Peripheral pulses 2+ and symmetric. No LE edema.  Resp: Speaking in full sentences, breath sounds equal and clear bilaterally. No wheezes, rales or rhonchi.  Abd: Soft, non-tender, non-distended. Normal bowel sounds in all 4 quadrants. No guarding or rebound.  MSK: Spine midline and non-tender. No CVAT.  Skin: dry blood on left and with no obvious alcerations or abrasions.   Neuro: Mild resting tremor in bilateral upper extremities   Psych: Anxious affect, occasional rapid speech. No verbalization of wish to harm self or others

## 2024-02-16 NOTE — BH PATIENT PROFILE - LAST ORAL INTAKE
16-Feb-2024 10:30 Fluconazole Counseling:  Patient counseled regarding adverse effects of fluconazole including but not limited to headache, diarrhea, nausea, upset stomach, liver function test abnormalities, taste disturbance, and stomach pain.  There is a rare possibility of liver failure that can occur when taking fluconazole.  The patient understands that monitoring of LFTs and kidney function test may be required, especially at baseline. The patient verbalized understanding of the proper use and possible adverse effects of fluconazole.  All of the patient's questions and concerns were addressed.

## 2024-02-16 NOTE — BH INPATIENT PSYCHIATRY ASSESSMENT NOTE - NSBHMETABOLIC_PSY_ALL_CORE_FT
BMI: BMI (kg/m2): 27.9 (02-16-24 @ 10:51)  HbA1c:   Glucose:   BP: --Vital Signs Last 24 Hrs  T(C): 37.1 (02-16-24 @ 10:51), Max: 37.1 (02-16-24 @ 10:51)  T(F): 98.7 (02-16-24 @ 10:51), Max: 98.7 (02-16-24 @ 10:51)  HR: 78 (02-16-24 @ 06:51) (63 - 92)  BP: 123/84 (02-16-24 @ 06:51) (123/71 - 134/87)  BP(mean): --  RR: 16 (02-16-24 @ 10:51) (16 - 19)  SpO2: 98% (02-16-24 @ 10:51) (96% - 98%)    Orthostatic VS  02-16-24 @ 10:51  Lying BP: --/-- HR: --  Sitting BP: 135/95 HR: 81  Standing BP: 131/97 HR: 98  Site: upper left arm  Mode: --    Lipid Panel:  BMI: BMI (kg/m2): 27.9 (02-16-24 @ 10:51)  HbA1c:   Glucose:   BP: --Vital Signs Last 24 Hrs  T(C): 37.1 (02-16-24 @ 10:51), Max: 37.1 (02-16-24 @ 10:51)  T(F): 98.7 (02-16-24 @ 10:51), Max: 98.7 (02-16-24 @ 10:51)  HR: 78 (02-16-24 @ 06:51) (63 - 78)  BP: 123/84 (02-16-24 @ 06:51) (123/71 - 123/84)  BP(mean): --  RR: 16 (02-16-24 @ 10:51) (16 - 19)  SpO2: 98% (02-16-24 @ 10:51) (96% - 98%)    Orthostatic VS  02-16-24 @ 10:51  Lying BP: --/-- HR: --  Sitting BP: 135/95 HR: 81  Standing BP: 131/97 HR: 98  Site: upper left arm  Mode: --    Lipid Panel:

## 2024-02-16 NOTE — BH INPATIENT PSYCHIATRY ASSESSMENT NOTE - NSSUICRSKFACTOR_PSY_ALL_CORE
Hospital Medicine Daily Progress Note    Date of Service  2/3/2023    Chief Complaint  Kaiden Quiroz is a 60 y.o. male admitted 1/12/2023 with L side weakness    Hospital Course  61yo PMHx HTN, T2DM, ongoing tobacco use, BMI 36, ETOH.  Presenting with L side weakness on 1/12.  [place] not to be TNK candidate.  Taken to IR for MCA trhombectomy with TICI 3 post and R ICA stent.  On DAPT x 6 weeks. Had 10 sec pause intra procedure but resolved before CPR was initiated.  Was intubated.  In ICU required Precedex for ETOH withdrawal.  PEG placed 1/24.  In 10-14 days, the anchor button suture or sutures should be cut, releasing the anchor or anchors into the stomach. The suture anchors will then pass through the GI tract as desired. This can be performed in the interventional radiology department if   desired. Call [phone] any weekday [phone] hours to make an appointment.    Patient started on atorvastatin. Echo unremarkable.  Hospital course complicated by urinary retention requiring Thacker (on Terazosin), pneumonia and respiratory failure on high flow nasal cannula (now weaned down to 2 L nasal cannula).    Interval Problem Update  - No acute overnight events  - Reports dry mouth, asking for ice chips  - Patient is sleepy this morning. But tolerating diet.   - I have spoke to wife at bedside yesterday afternoon. Updated patient's current treatment plans and questions have been answered.   -  PT/OT rec SNF  - voiding trial successful, thacker removed.  - labs reviewed, stable    I have discussed this patient's plan of care and discharge plan at IDT rounds today with Case Management, Nursing, Nursing leadership, and other members of the IDT team.    Consultants/Specialty  neurology    Code Status  Full Code    Disposition  Patient is medically cleared for discharge.   Anticipate discharge to to skilled nursing facility.  I have placed the appropriate orders for post-discharge needs.    Review of Systems  Review of  Systems   HENT:          Dry mouth   Respiratory:  Negative for shortness of breath.    Genitourinary:  Negative for hematuria.   Neurological:  Positive for speech change and focal weakness.      Physical Exam  Temp:  [35.9 °C (96.7 °F)-36.9 °C (98.4 °F)] 36.7 °C (98.1 °F)  Pulse:  [74-79] 74  Resp:  [18] 18  BP: (134-152)/(76-85) 152/85  SpO2:  [94 %-97 %] 97 %    Physical Exam  Constitutional:       General: He is not in acute distress.     Appearance: Normal appearance. He is well-developed. He is not diaphoretic.      Comments: Sleepy but wakes to voice   HENT:      Head: Normocephalic and atraumatic.      Nose: Nose normal.      Mouth/Throat:      Mouth: Mucous membranes are moist.   Eyes:      General: No scleral icterus.     Conjunctiva/sclera: Conjunctivae normal.   Neck:      Vascular: No JVD.   Cardiovascular:      Rate and Rhythm: Normal rate.      Heart sounds: No murmur heard.    No gallop.   Pulmonary:      Effort: Pulmonary effort is normal. No respiratory distress.      Breath sounds: No stridor. No wheezing or rales.   Abdominal:      Palpations: Abdomen is soft.      Tenderness: There is no abdominal tenderness. There is no guarding or rebound.      Comments: Gtube in place c/d/i   Musculoskeletal:         General: No tenderness.      Right lower leg: No edema.      Left lower leg: No edema.   Skin:     General: Skin is warm and dry.      Capillary Refill: Capillary refill takes less than 2 seconds.      Coloration: Skin is not jaundiced or pale.      Findings: No rash.   Neurological:      Comments:   Pt does not attend to L side even with prompting and stim  Speech improved  +L side facial droop  L arm and leg flacid  Purposeful and 5/5 R upper and lower   Psychiatric:         Mood and Affect: Mood and affect normal.         Behavior: Behavior normal.       Fluids    Intake/Output Summary (Last 24 hours) at 2/3/2023 1146  Last data filed at 2/3/2023 0530  Gross per 24 hour   Intake 280 ml    Output 1480 ml   Net -1200 ml         Laboratory  Recent Labs     02/03/23  0436   WBC 8.7   RBC 4.20*   HEMOGLOBIN 13.7*   HEMATOCRIT 40.4*   MCV 96.2   MCH 32.6   MCHC 33.9   RDW 43.9   PLATELETCT 241   MPV 11.3       Recent Labs     02/01/23  0241 02/02/23  0635 02/03/23  0436   SODIUM 138 137 138   POTASSIUM 4.0 4.0 4.2   CHLORIDE 102 102 101   CO2 25 27 25   GLUCOSE 145* 110* 121*   BUN 21 20 21   CREATININE 0.52 0.52 0.54   CALCIUM 9.4 9.4 9.7                     Imaging  IR-GASTROSTOMY PLACEMENT   Final Result      1.  Fluoroscopic guided percutaneous gastrostomy with placement of an 18-Vietnamese ELISE balloon gastrostomy catheter.      2. The gastrostomy tube may be used for tube feeds 18 hours after placement. Thereafter, liquid diet orders per the referring physician. Saline flush daily as per protocol.      3. In 10-14 days, the anchor button suture or sutures should be cut, releasing the anchor or anchors into the stomach. The suture anchors will then pass through the GI tract as desired. This can be performed in the interventional radiology department if    desired. Call [phone] any weekday [phone] hours to make an appointment.      CT-CTA CHEST PULMONARY ARTERY W/ RECONS   Final Result         1.  No pulmonary embolus appreciated.   2.  Linear densities the bilateral lung bases suggests changes of atelectasis, component of infiltrate is not excluded.   3.  Atherosclerosis and atherosclerotic coronary artery disease.      US-EXTREMITY VENOUS LOWER BILAT   Final Result      DX-ABDOMEN FOR TUBE PLACEMENT   Final Result      NG tube extends below the diaphragm into the region of the stomach. The tip is not visualized.      EC-ECHOCARDIOGRAM LTD W/ CONT   Final Result      DX-CHEST-PORTABLE (1 VIEW)   Final Result         1. No significant interval change.      CT-ABDOMEN-PELVIS W/O   Final Result         1.  Nondependent foci of air in the bladder, could represent changes from recent instrumentation or  urinary tract infection.   2.  Diverticulosis   3.  Atherosclerosis and atherosclerotic coronary artery disease      DX-CHEST-PORTABLE (1 VIEW)   Final Result      Ill-defined atelectasis versus consolidation in the left lower lung. Pneumonia can be considered in appropriate clinical setting.         DX-ABDOMEN FOR TUBE PLACEMENT   Final Result         1.  Nonspecific bowel gas pattern in the upper abdomen.   2.  Nasogastric tube tip terminates overlying the expected location of the gastric body.   3.  Cardiomegaly   4.  Hazy linear density left lung base could represent atelectasis or infiltrate.      CT-HEAD W/O   Final Result         1.  Evolving infarct/encephalomalacia in the right MCA distribution.   2.  Areas of hemorrhage in the right basal ganglia and involving the right frontal and anterior temporal sulci, similar compared to MRI January 13, 2023   3.  Atherosclerosis.      DX-CHEST-PORTABLE (1 VIEW)   Final Result         Diffuse interstitial and groundglass opacity throughout both lungs could relate to developing pulmonary edema.      DX-ABDOMEN FOR TUBE PLACEMENT   Final Result         Gastric drainage tube with tip projecting over the expected area of the stomach.      DX-CHEST-PORTABLE (1 VIEW)   Final Result      1.  Interval extubation without pneumothorax.   2.  Stable enlarged cardiac silhouette.   3.  There is bibasilar linear atelectasis.      EC-ECHOCARDIOGRAM COMPLETE W/ CONT   Final Result      MR-BRAIN-W/O   Final Result         Acute infarction involving the right frontal region, right basal ganglia and right caudate nucleus. Minimal punctate infarction involving the right parietal cortex. There is slight mass effect upon the right lateral ventricle with only minimal midline    shift measuring approximately 4 mm.      Petechial hemorrhage is noted within the right frontal cortical infarct. Also noted is petechial hemorrhage in the right basal ganglia and right caudate nucleus but without  surrounding vasogenic edema.         DX-PELVIS-1 OR 2 VIEWS   Final Result         1.  No acute traumatic bony injury.      DX-CHEST-PORTABLE (1 VIEW)   Final Result         1.  Left midlung and lower lobe infiltrates, similar to prior study.      FF-FIEGQNH-5 VIEW   Final Result         1.  Nonspecific bowel gas pattern in the upper abdomen.   2.  Cardiomegaly   3.  Hazy left lower lobe infiltrates.   4.  Nasogastric tube tip terminates overlying the expected location of the gastric fundus.      IR-THROMBO MECHANICAL ARTERY,INIT   Final Result         60-year-old patient who presented with complete occlusion of the right ICA at its origin underwent emergent mechanical thrombectomy. After initial angioplasty,   a right cervical internal carotid artery stent was deployed and right MCA thrombectomy was performed. The final angiographic images show complete restoration of flow into the right ICA and MCA with no evidence of distal intracranial embolization.      Final recanalization score: TICI 3      I, Petra Mobley was physically present and participated during the entire procedure of the IR-THROMBO MECHANICAL ARTERY,INIT.                  DX-CHEST-PORTABLE (1 VIEW)   Final Result      No acute cardiac or pulmonary abnormalities are identified.      CT-CTA NECK WITH & W/O-POST PROCESSING   Final Result      1.  Occlusion of the right internal carotid artery at its origin.      2.  Occlusion of the right vertebral artery at its origin.      Dr. Gongora discussed findings with Dr. Mccarthy.      CT-CTA HEAD WITH & W/O-POST PROCESS   Final Result         1.  Occlusion of the right internal carotid artery and right middle cerebral artery.   2.  Narrowed left M1 segment with severely narrowed and M2 and M3 branches, may represent chronic changes.      These findings were discussed with the patient's clinician, Joseline Mccarthy, on 1/12/2023 8:27 PM.      CT-CEREBRAL PERFUSION ANALYSIS   Final Result         1.  Cerebral  blood flow less than 30% likely representing completed infarct = 52 mL.      2.  T Max more than 6 seconds likely representing combination of completed infarct and ischemia = 172 mL.      3.  Mismatched volume likely representing ischemic brain/penumbra = 120 mL      4.  Please note that the cerebral perfusion was performed on the limited brain tissue around the basal ganglia region. Infarct/ischemia outside the CT perfusion sections can be missed in this study.      CT-HEAD W/O   Final Result      1.  No evidence of intracranial hemorrhage, midline shift or mass effect.      2.  Question of hyperdense right MCA though evaluation limited by oblique positioning.                Assessment/Plan  * Acute ischemic right middle cerebral artery (MCA) stroke (HCC)- (present on admission)  Assessment & Plan  Right MCA CVA with hemorrhagic conversion  Neurology consulted   High Intensity statin therapy   BP control  S/p right ICA stent and MCA thrombectomy 1/12/23  DAPT  TTE negative for shunt  Tobacco cessation  PT/OT/SLP - recommending post-acute placement  PEG placed 1/24     Urinary retention  Assessment & Plan  Likely in the setting of BPH  Tried removing thacker 1/25, had to replace  Starting terazosin 1/28    Small amount of sediment in the tubing, however no significant hematuria, UA obtained which did show RBCs, CBC normal and afebrile  Voiding trial   Thacker removed on 1/31    Internal carotid artery stent present  Assessment & Plan  Placed 1/12  Per Neuro OK to start DAPT: ASA & Plavix for 6 wks    Hypernatremia  Assessment & Plan  Patient continues to have hyponatremia with elevated sodium level.  Resolved with Free H2O 200ml q6  Cont to follow daily    Pneumonia due to infectious organism- (present on admission)  Assessment & Plan  Resolved    Aspiration pneumonia (Developed 01/19/23)   Procal low  Sputum cultures neg  Strict aspiration precautions   Has completed 5 days of Abx's  MRSA nares neg  Mucomyst/3% normal  saline nebs to maintain pulmonary toilet, RT protocol to continue  Pulmonology evaluated him and made recommendations          Oropharyngeal dysphagia- (present on admission)  Assessment & Plan  Secondary to acute stroke.    Aspiration precautions   PEG 1/24  Nutrition following    Acute left hemiparesis ([facility])- (present on admission)  Assessment & Plan  Secondary to right MCA stroke.    Pressure ulcer precautions  Aspiration, Fall precautions  PT and OT  Working toward Rehab DC    Stroke, hemorrhagic ([facility])- (present on admission)  Assessment & Plan  Ischemic stroke with hemorrhagic conversion.  Neuro has OKd for antiplatelet therapy with ASA and plavix given presence of new ICA stent    Toxic metabolic encephalopathy- (present on admission)  Assessment & Plan  Improved    Persistent 01/20/23  Secondary to stroke, delirium and now concern for relation to underlying pneumonia  Limit restraints as able  Avoid polypharmacy, sedating medications  Delirium precautions  Management of underlying medical conditions  Obtain EEG 01/20/23 and it did not show seizure activity.   cont's to improve daily though examination is limited by dysarthria at times    Acute respiratory failure with hypoxia ([facility])- (present on admission)  Assessment & Plan  Improved     01/19/23 CXRAY consistent with Aspiration Pneumonia - Started on IV unasyn  Subsequently trnasitioned to [facility].  At present off Abx's  MRSA nares neg  trachael aspirate cultures neg  CTA chest neg for PE  TTE LVEF 70%, no signficant valvular or structural abnormalities  O2 demand decreasing: now off HFNC and on 2 LNC  Cont weaning O2    Severe obesity (BMI 35.0-35.9 with comorbidity) ([facility])- (present on admission)  Assessment & Plan  Body mass index is 37.39 kg/m².  Co-morbid HTN, T2DM, KENDAL  Outpatient weight loss    Type 2 diabetes mellitus without complication, without long-term current use of insulin ([facility])- (present on admission)  Assessment & Plan  A1c 5.3  No  indication for strict BG control - POCT/SSI deferred  Atorvastatin    Alcohol dependence with withdrawal (HCC)- (present on admission)  Assessment & Plan  Resolved.    Tobacco use disorder- (present on admission)  Assessment & Plan  On nicotine patch     Primary hypertension- (present on admission)  Assessment & Plan  Amlodipine 10 mg         VTE prophylaxis: enoxaparin ppx    I have performed a physical exam and reviewed and updated ROS and Plan today (2/3/2023). In review of yesterday's note (2/2/2023), there are no changes except as documented above.         Current and Past Psychiatric Diagnoses

## 2024-02-17 PROCEDURE — 99232 SBSQ HOSP IP/OBS MODERATE 35: CPT

## 2024-02-17 RX ORDER — CARBIDOPA AND LEVODOPA 25; 100 MG/1; MG/1
1 TABLET ORAL
Refills: 0 | Status: DISCONTINUED | OUTPATIENT
Start: 2024-02-17 | End: 2024-02-21

## 2024-02-17 RX ORDER — GABAPENTIN 400 MG/1
300 CAPSULE ORAL
Refills: 0 | Status: DISCONTINUED | OUTPATIENT
Start: 2024-02-17 | End: 2024-02-20

## 2024-02-17 RX ORDER — CARBIDOPA AND LEVODOPA 25; 100 MG/1; MG/1
1 TABLET ORAL ONCE
Refills: 0 | Status: COMPLETED | OUTPATIENT
Start: 2024-02-17 | End: 2024-02-17

## 2024-02-17 RX ADMIN — CARBIDOPA AND LEVODOPA 1 TABLET(S): 25; 100 TABLET ORAL at 18:58

## 2024-02-17 RX ADMIN — GABAPENTIN 300 MILLIGRAM(S): 400 CAPSULE ORAL at 05:44

## 2024-02-17 RX ADMIN — GABAPENTIN 300 MILLIGRAM(S): 400 CAPSULE ORAL at 20:10

## 2024-02-17 RX ADMIN — GABAPENTIN 300 MILLIGRAM(S): 400 CAPSULE ORAL at 13:35

## 2024-02-17 RX ADMIN — QUETIAPINE FUMARATE 25 MILLIGRAM(S): 200 TABLET, FILM COATED ORAL at 02:42

## 2024-02-17 RX ADMIN — QUETIAPINE FUMARATE 100 MILLIGRAM(S): 200 TABLET, FILM COATED ORAL at 20:10

## 2024-02-17 RX ADMIN — Medication 10 MILLIGRAM(S): at 16:22

## 2024-02-17 RX ADMIN — Medication 10 MILLIGRAM(S): at 08:21

## 2024-02-17 RX ADMIN — PRAMIPEXOLE DIHYDROCHLORIDE 0.5 MILLIGRAM(S): 0.12 TABLET ORAL at 20:10

## 2024-02-17 RX ADMIN — CARBIDOPA AND LEVODOPA 1 TABLET(S): 25; 100 TABLET ORAL at 10:42

## 2024-02-17 RX ADMIN — RASAGILINE 1 MILLIGRAM(S): 0.5 TABLET ORAL at 08:21

## 2024-02-17 RX ADMIN — CARBIDOPA AND LEVODOPA 1 TABLET(S): 25; 100 TABLET ORAL at 14:05

## 2024-02-17 RX ADMIN — CARBIDOPA AND LEVODOPA 1 TABLET(S): 25; 100 TABLET ORAL at 16:21

## 2024-02-17 RX ADMIN — GABAPENTIN 300 MILLIGRAM(S): 400 CAPSULE ORAL at 16:36

## 2024-02-17 RX ADMIN — PRAMIPEXOLE DIHYDROCHLORIDE 0.5 MILLIGRAM(S): 0.12 TABLET ORAL at 08:21

## 2024-02-17 NOTE — PHYSICAL THERAPY INITIAL EVALUATION ADULT - PERTINENT HX OF CURRENT PROBLEM, REHAB EVAL
Patient is 48 year old man  domiciled hs educated non caregiver employed though applying for disability no formal psychiatric history (no mental health diagnoses no prior inpatient psychiatric hospitalizations no outpatient mental health follow up no prior suicide attempts or non suicidal self injurious behavior no instances of violence aggression violation of orders of protection), PMH: parkinson's who was brought in by ems after police were called to his house for PI including locking self in bedroom and breaking window to flee when police were at his door

## 2024-02-17 NOTE — BH INPATIENT PSYCHIATRY PROGRESS NOTE - NSBHASSESSSUMMFT_PSY_ALL_CORE
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    PLAN  2PC admit  Q15 adequate  PT consult  Psych:  start seroquel 100 qhs  continue rasagiline 1 qd  Comorbids:  continue carbi/levo 25/100--changed to qid on 2/17  continue pramipexole 0.5 BID  PRNs: seroquel 12.5 and 100 PO PRNs  PAIN MGMT: ketorolac 15 IM x10 doses PRN max over 5 days as BID  PO 10 mg q6 ketorolac  neurontin 300 QID PRN for back pain  Family collateral  NEURO collateral  NEURO consult initiated  G&M therapy  Supportive therapy as tolerated  DISPO TBD

## 2024-02-17 NOTE — BH INPATIENT PSYCHIATRY PROGRESS NOTE - NSBHFUPINTERVALHXFT_PSY_A_CORE
f/up psychosis, care discussed w/ nursing, VSS. Patient reported that he has increased rigidity in between taking the Sinemet. Appeared to be having a difficulty ambulating and DYLAN to check with neurology if patient can have extra Sinemet. Patient was not able to explain fully why he was taking Adderall and Suboxone on the outside. Patient denied feeling depressed, denied SI/I/P. Patient reported sleeping well and with good appetite. Patient later observed visiting with his wife after getting Sinemet, which was changed to qid.

## 2024-02-17 NOTE — BH CHART NOTE - NSEVENTNOTEFT_PSY_ALL_CORE
Interval History:  DYLAN called to evaluate pt for Parkinson's Disease symptoms. Per staff, pt has good mobility for about 2 hours after taking Sinemet and then drug appears to "wear off", pt becomes rigid, has extreme difficulty initiating movements (walking, getting up from the ground). On assessment, pt states he feels great after taking the Sinemet for about 2-3 hours, then feels like it wears off rapidly. When it wears off he has overwhelming anxiety because he cannot move and worries about being able to defend self. Feels he cannot participate in treatment due to limitations of his Parkinson's Disease symptoms. At home, pt had been regularly taking 6 doses of Sinemet per day. He had also been taking Adderall to give him a boost in his energy/movement. Pt last saw his outpatient neurologist about a month ago, but felt ashamed/embarrassed to tell the neurologist that he was misusing the medication. Outpatient neurologist did not adjust dose of medications as they though pt's symptoms were well controlled. Pt endorses feeling stuck, more rigid, and difficulty initiating movement. Pt denies chest pain, SOB, or edema. Denies headache, visual changes, confusion, or n/v.       T(C): 36.6 (02-17-24 @ 07:55), Max: 36.6 (02-17-24 @ 07:55)  HR: 92 (02-17-24 @ 07:55) (92 - 92)  BP: 135/85 (02-17-24 @ 07:55) (135/85 - 135/85)  RR: --  SpO2: --    Physical Exam:  Gen: Patient sitting on hospital bed, appears anxious   HEENT: NC/AT,  EOMI.    Resp: CTA b/l. No wheezes, ronchi, or crackles.   CV: Radial pulses 2+ b/l, RRR, no murmurs.    Ext: Mild rigidity in upper extremities bilaterally. Strength and sensation intact in upper and lower extremities bilaterally. Finger to nose and heel to shin coordination normal. Resting pill rolling tremor in L hand, R foot tremor more pronounced while performing motor task. No clubbing, edema, or cyanosis.   Neuro: awake, alert, grossly oriented. Bradykinesia. Slow gait with decreased arm swing.    Assessment:  DYLAN called to evaluate pt for Parkinson's Disease symptoms. Pt with rapid wear off of Sinemet after about 2-3 hours. Pt's prescribed home dose is four times a day, but pt has regularly been taking Sinemet 6 times per day. Pt has difficulty discussing overuse of medication with outpatient neurologist so symptoms have not been well controlled. Discussed case with Hospitalist. [facility] neurology consulted, scheduled to see pt on 2/20. Increase to Sinemet 5 times per day as typical recommendations are to increase daily dose by 1 tablet of carbidopa 25 mg/levodopa 100 mg every 1 to 2 days. Pt with overwhelming anxiety, recommended pt take PRN gabapentin. Primary team to f/u.    Plan:  - Plan to increase to Sinemet to 5 times per day at 7:00, 10:00, 13:00, 16:00, 19:00.  - If needed can increase to Sinemet 6 times per day starting 2/18  - Discussed with staff to contact DYLAN with any new or worsening symptoms.

## 2024-02-17 NOTE — BH INPATIENT PSYCHIATRY PROGRESS NOTE - CURRENT MEDICATION
MEDICATIONS  (STANDING):  carbidopa/levodopa  25/100 1 Tablet(s) Oral <User Schedule>  influenza   Vaccine 0.5 milliLiter(s) IntraMuscular once  pramipexole 0.5 milliGRAM(s) Oral two times a day  QUEtiapine 100 milliGRAM(s) Oral at bedtime  rasagiline Tablet 1 milliGRAM(s) Oral daily    MEDICATIONS  (PRN):  gabapentin 300 milliGRAM(s) Oral four times a day PRN neuropathic  ketorolac 10 milliGRAM(s) Oral every 6 hours PRN pain  ketorolac   Injectable 15 milliGRAM(s) IntraMuscular two times a day PRN back pain/sciatica  lidocaine   4% Patch 1 Patch Transdermal daily PRN Lumbago  QUEtiapine 25 milliGRAM(s) Oral every 4 hours PRN severe anxiety or agitation or aggression  QUEtiapine 100 milliGRAM(s) Oral every 4 hours PRN severe agitation/aggression

## 2024-02-18 PROCEDURE — 99232 SBSQ HOSP IP/OBS MODERATE 35: CPT

## 2024-02-18 RX ORDER — QUETIAPINE FUMARATE 200 MG/1
150 TABLET, FILM COATED ORAL AT BEDTIME
Refills: 0 | Status: DISCONTINUED | OUTPATIENT
Start: 2024-02-18 | End: 2024-02-20

## 2024-02-18 RX ADMIN — PRAMIPEXOLE DIHYDROCHLORIDE 0.5 MILLIGRAM(S): 0.12 TABLET ORAL at 09:17

## 2024-02-18 RX ADMIN — QUETIAPINE FUMARATE 150 MILLIGRAM(S): 200 TABLET, FILM COATED ORAL at 20:08

## 2024-02-18 RX ADMIN — CARBIDOPA AND LEVODOPA 1 TABLET(S): 25; 100 TABLET ORAL at 09:36

## 2024-02-18 RX ADMIN — CARBIDOPA AND LEVODOPA 1 TABLET(S): 25; 100 TABLET ORAL at 06:35

## 2024-02-18 RX ADMIN — Medication 15 MILLIGRAM(S): at 17:09

## 2024-02-18 RX ADMIN — Medication 15 MILLIGRAM(S): at 14:04

## 2024-02-18 RX ADMIN — PRAMIPEXOLE DIHYDROCHLORIDE 0.5 MILLIGRAM(S): 0.12 TABLET ORAL at 20:08

## 2024-02-18 RX ADMIN — CARBIDOPA AND LEVODOPA 1 TABLET(S): 25; 100 TABLET ORAL at 12:07

## 2024-02-18 RX ADMIN — CARBIDOPA AND LEVODOPA 1 TABLET(S): 25; 100 TABLET ORAL at 16:27

## 2024-02-18 RX ADMIN — Medication 10 MILLIGRAM(S): at 06:35

## 2024-02-18 RX ADMIN — GABAPENTIN 300 MILLIGRAM(S): 400 CAPSULE ORAL at 09:36

## 2024-02-18 RX ADMIN — RASAGILINE 1 MILLIGRAM(S): 0.5 TABLET ORAL at 09:17

## 2024-02-18 RX ADMIN — GABAPENTIN 300 MILLIGRAM(S): 400 CAPSULE ORAL at 19:47

## 2024-02-18 RX ADMIN — CARBIDOPA AND LEVODOPA 1 TABLET(S): 25; 100 TABLET ORAL at 18:31

## 2024-02-18 RX ADMIN — GABAPENTIN 300 MILLIGRAM(S): 400 CAPSULE ORAL at 00:16

## 2024-02-18 NOTE — BH INPATIENT PSYCHIATRY PROGRESS NOTE - NSBHASSESSSUMMFT_PSY_ALL_CORE
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    PLAN  2PC admit  Q15 adequate  PT consult  Psych:  increase seroquel 150 qhs  continue rasagiline 1 qd  Comorbids:  continue carbi/levo 25/100--changed to qid on 2/17  continue pramipexole 0.5 BID  PRNs: seroquel 12.5 and 100 PO PRNs  PAIN MGMT: ketorolac 15 IM x10 doses PRN max over 5 days as BID  PO 10 mg q6 ketorolac  neurontin 300 QID PRN for back pain  Family collateral  NEURO collateral  NEURO consult initiated  G&M therapy  Supportive therapy as tolerated  DISPO TBD

## 2024-02-18 NOTE — BH INPATIENT PSYCHIATRY PROGRESS NOTE - NSBHFUPINTERVALHXFT_PSY_A_CORE
f/up psychosis, care discussed w/ nursing, VSS. Patient continued to complain of rigidity in between his Sinemet doses, observed to be kneeling on the floor by the bed. RN reported that he had declined Toradol in the AM. denied SI/I/P. Patient reported poor sleep at night due to not having Sinemet at night,  and with good appetite. Patient continues to perseverate on "carbidopa helping" his "entire body". Reported lower back pain--instructed patient to consider taking Toradol. denied dizziness.

## 2024-02-18 NOTE — BH INPATIENT PSYCHIATRY PROGRESS NOTE - CURRENT MEDICATION
MEDICATIONS  (STANDING):  carbidopa/levodopa  25/100 1 Tablet(s) Oral <User Schedule>  influenza   Vaccine 0.5 milliLiter(s) IntraMuscular once  pramipexole 0.5 milliGRAM(s) Oral two times a day  QUEtiapine 150 milliGRAM(s) Oral at bedtime  rasagiline Tablet 1 milliGRAM(s) Oral daily    MEDICATIONS  (PRN):  gabapentin 300 milliGRAM(s) Oral four times a day PRN neuropathic pain/anxiety  ketorolac 10 milliGRAM(s) Oral every 6 hours PRN pain  ketorolac   Injectable 15 milliGRAM(s) IntraMuscular two times a day PRN back pain/sciatica  lidocaine   4% Patch 1 Patch Transdermal daily PRN Lumbago  QUEtiapine 25 milliGRAM(s) Oral every 4 hours PRN severe anxiety or agitation or aggression  QUEtiapine 100 milliGRAM(s) Oral every 4 hours PRN severe agitation/aggression

## 2024-02-19 PROCEDURE — 99232 SBSQ HOSP IP/OBS MODERATE 35: CPT

## 2024-02-19 RX ADMIN — CARBIDOPA AND LEVODOPA 1 TABLET(S): 25; 100 TABLET ORAL at 09:42

## 2024-02-19 RX ADMIN — PRAMIPEXOLE DIHYDROCHLORIDE 0.5 MILLIGRAM(S): 0.12 TABLET ORAL at 08:29

## 2024-02-19 RX ADMIN — GABAPENTIN 300 MILLIGRAM(S): 400 CAPSULE ORAL at 00:46

## 2024-02-19 RX ADMIN — Medication 10 MILLIGRAM(S): at 06:54

## 2024-02-19 RX ADMIN — GABAPENTIN 300 MILLIGRAM(S): 400 CAPSULE ORAL at 10:51

## 2024-02-19 RX ADMIN — PRAMIPEXOLE DIHYDROCHLORIDE 0.5 MILLIGRAM(S): 0.12 TABLET ORAL at 20:24

## 2024-02-19 RX ADMIN — CARBIDOPA AND LEVODOPA 1 TABLET(S): 25; 100 TABLET ORAL at 16:04

## 2024-02-19 RX ADMIN — CARBIDOPA AND LEVODOPA 1 TABLET(S): 25; 100 TABLET ORAL at 13:03

## 2024-02-19 RX ADMIN — CARBIDOPA AND LEVODOPA 1 TABLET(S): 25; 100 TABLET ORAL at 06:33

## 2024-02-19 RX ADMIN — Medication 10 MILLIGRAM(S): at 01:15

## 2024-02-19 RX ADMIN — RASAGILINE 1 MILLIGRAM(S): 0.5 TABLET ORAL at 08:29

## 2024-02-19 RX ADMIN — GABAPENTIN 300 MILLIGRAM(S): 400 CAPSULE ORAL at 20:23

## 2024-02-19 RX ADMIN — Medication 10 MILLIGRAM(S): at 00:44

## 2024-02-19 RX ADMIN — QUETIAPINE FUMARATE 150 MILLIGRAM(S): 200 TABLET, FILM COATED ORAL at 20:24

## 2024-02-19 RX ADMIN — CARBIDOPA AND LEVODOPA 1 TABLET(S): 25; 100 TABLET ORAL at 18:27

## 2024-02-19 NOTE — BH INPATIENT PSYCHIATRY PROGRESS NOTE - NSBHFUPINTERVALHXFT_PSY_A_CORE
f/up psychosis, care discussed w/ nursing, VSS. Patient observed sitting in his room, appeared to be comfortable, very focused on sinemet dosing and needing increased sinemet despite recent increase. Reported better relief after taking Toradol IM from his pain.  denied SI/I/P.  Reported sleep improving with Seroquel increase and with good appetite. denied dizziness. Repeats that he was not telling his neurologist Dr. Saeed that he was self medicating with the Sinemet.  f/up psychosis, care discussed w/ nursing, vitals: diastolic elevated, asymptomatic, ordered repeat vitals.  Patient observed sitting in his room, appeared to be comfortable, very focused on sinemet dosing and needing increased sinemet despite recent increase. Reported better relief after taking Toradol IM from his pain.  denied SI/I/P.  Reported sleep improving with Seroquel increase and with good appetite. denied dizziness. Repeats that he was not telling his neurologist Dr. Saeed that he was self medicating with the Sinemet.

## 2024-02-19 NOTE — BH INPATIENT PSYCHIATRY PROGRESS NOTE - NSBHASSESSSUMMFT_PSY_ALL_CORE
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    PLAN  2PC admit  Q15 adequate  PT consult  Psych:  c/w seroquel 150 qhs  continue rasagiline 1 qd  Comorbids:  continue carbi/levo 25/100--changed to qid on 2/17  continue pramipexole 0.5 BID  PRNs: seroquel 12.5 and 100 PO PRNs  PAIN MGMT: ketorolac 15 IM x10 doses PRN max over 5 days as BID  PO 10 mg q6 ketorolac  neurontin 300 QID PRN for back pain  Family collateral  NEURO collateral  NEURO consult initiated  G&M therapy  Supportive therapy as tolerated  DISPO TBD

## 2024-02-20 RX ORDER — QUETIAPINE FUMARATE 200 MG/1
100 TABLET, FILM COATED ORAL AT BEDTIME
Refills: 0 | Status: DISCONTINUED | OUTPATIENT
Start: 2024-02-20 | End: 2024-02-21

## 2024-02-20 RX ORDER — GABAPENTIN 400 MG/1
400 CAPSULE ORAL
Refills: 0 | Status: DISCONTINUED | OUTPATIENT
Start: 2024-02-20 | End: 2024-02-27

## 2024-02-20 RX ADMIN — GABAPENTIN 400 MILLIGRAM(S): 400 CAPSULE ORAL at 16:02

## 2024-02-20 RX ADMIN — CARBIDOPA AND LEVODOPA 1 TABLET(S): 25; 100 TABLET ORAL at 09:05

## 2024-02-20 RX ADMIN — PRAMIPEXOLE DIHYDROCHLORIDE 0.5 MILLIGRAM(S): 0.12 TABLET ORAL at 21:16

## 2024-02-20 RX ADMIN — CARBIDOPA AND LEVODOPA 1 TABLET(S): 25; 100 TABLET ORAL at 12:34

## 2024-02-20 RX ADMIN — QUETIAPINE FUMARATE 100 MILLIGRAM(S): 200 TABLET, FILM COATED ORAL at 21:16

## 2024-02-20 RX ADMIN — CARBIDOPA AND LEVODOPA 1 TABLET(S): 25; 100 TABLET ORAL at 06:04

## 2024-02-20 RX ADMIN — PRAMIPEXOLE DIHYDROCHLORIDE 0.5 MILLIGRAM(S): 0.12 TABLET ORAL at 09:05

## 2024-02-20 RX ADMIN — CARBIDOPA AND LEVODOPA 1 TABLET(S): 25; 100 TABLET ORAL at 18:33

## 2024-02-20 RX ADMIN — GABAPENTIN 300 MILLIGRAM(S): 400 CAPSULE ORAL at 08:08

## 2024-02-20 RX ADMIN — CARBIDOPA AND LEVODOPA 1 TABLET(S): 25; 100 TABLET ORAL at 15:55

## 2024-02-20 RX ADMIN — RASAGILINE 1 MILLIGRAM(S): 0.5 TABLET ORAL at 09:05

## 2024-02-20 NOTE — DIETITIAN INITIAL EVALUATION ADULT - PERTINENT MEDS FT
MEDICATIONS  (STANDING):  carbidopa/levodopa  25/100 1 Tablet(s) Oral <User Schedule>  pramipexole 0.5 milliGRAM(s) Oral two times a day  QUEtiapine 150 milliGRAM(s) Oral at bedtime  rasagiline Tablet 1 milliGRAM(s) Oral daily    MEDICATIONS  (PRN):  gabapentin 300 milliGRAM(s) Oral four times a day PRN neuropathic pain/anxiety  ketorolac 10 milliGRAM(s) Oral every 6 hours PRN pain  ketorolac   Injectable 15 milliGRAM(s) IntraMuscular two times a day PRN back pain/sciatica  lidocaine   4% Patch 1 Patch Transdermal daily PRN Lumbago  QUEtiapine 25 milliGRAM(s) Oral every 4 hours PRN severe anxiety or agitation or aggression  QUEtiapine 100 milliGRAM(s) Oral every 4 hours PRN severe agitation/aggression

## 2024-02-20 NOTE — DIETITIAN INITIAL EVALUATION ADULT - OTHER INFO
Pt. is a 48 year old male with no formal psychiatric history (no mental health diagnoses no prior inpatient psychiatric hospitalizations no outpatient mental health follow up no prior suicide attempts or non suicidal self injurious behavior no instances of violence aggression violation of orders of protection), [facility] parkinsons who was brought in by ems after police were called to his house for PI including locking self in bedroom and breaking window to flee when police were at his door.    Met with pt. in his bedroom today. Pt. reported good appetite with eating three meals daily. Pt. mentions he is mindful to leave a 30 minute time gap between taking his Parkinson's medication and eating his meal to allow for the medication to absorb. NKFA reported. Pt. inquired about mediterranean diet.  Writer provided education on increasing whole grain, fruit and vegetable intake. Fruit at breakfast added on Cboard. Pt. denies any GI issues at this time (nausea/vomiting/diarrhea/constipation).  Pt. is a 48 year old male with no formal psychiatric history (no mental health diagnoses no prior inpatient psychiatric hospitalizations no outpatient mental health follow up no prior suicide attempts or non suicidal self injurious behavior no instances of violence aggression violation of orders of protection), [facility] parkinsons who was brought in by ems after police were called to his house for PI including locking self in bedroom and breaking window to flee when police were at his door.    Met with pt. in his bedroom today. Pt. reported good appetite with eating three meals daily. Pt. mentions he is mindful to leave a 30 minute time gap between taking his Parkinson's medication and eating his meal to allow for the medication to absorb. NKFA reported. Pt. inquired about mediterranean diet.  Writer provided education on increasing whole grain, fruit and vegetable intake. Fruit at breakfast added on Cboard. Pt. denies any GI issues at this time (nausea/vomiting/diarrhea/constipation). Pt. denies any chewing/swallowing difficulties on current diet. Current adm wt. 167 lbs (02-16-24).

## 2024-02-20 NOTE — BH INPATIENT PSYCHIATRY PROGRESS NOTE - NSBHFUPINTERVALHXFT_PSY_A_CORE
f/up psychosis, care discussed w/ nursing, VSS but anxious with hyperkinesia evident at times.  Patient observed sitting in his room, appeared to be comfortable, very focused on sinemet dosing and needing increased sinemet despite recent increase and agrees to NEURO consult today/WED. Reported better relief after taking Toradol IM from his pain last night, but also feels gabapentin helping.  denied SHIIP and AVTH.  Reported sleep improving with Seroquel increase and with good appetite but possible worsening of PD symptoms 2/2 seroquel. denied dizziness. Repeats that he was not telling his neurologist Dr. Saeed that he was self medicating with the Sinemet but agrees to give complete hx to NEURO team.  Endorses now that using adderall was a bad idea and unsafe.  Will attempt collateral from wife.  No new SEs reported or observed. Some rapport with his MD.

## 2024-02-20 NOTE — BH INPATIENT PSYCHIATRY PROGRESS NOTE - CURRENT MEDICATION
MEDICATIONS  (STANDING):  carbidopa/levodopa CR 25/100 1 Tablet(s) Oral <User Schedule>  gabapentin 600 milliGRAM(s) Oral at bedtime  pramipexole 0.5 milliGRAM(s) Oral two times a day  rasagiline Tablet 1 milliGRAM(s) Oral daily    MEDICATIONS  (PRN):  carbidopa/levodopa  25/100 1 Tablet(s) Oral daily PRN PD  gabapentin 400 milliGRAM(s) Oral four times a day PRN neuropathic pain/anxiety  ketorolac 10 milliGRAM(s) Oral every 6 hours PRN pain  lidocaine   4% Patch 1 Patch Transdermal daily PRN Lumbago  QUEtiapine 50 milliGRAM(s) Oral every 4 hours PRN severe agitation/aggression  QUEtiapine 25 milliGRAM(s) Oral every 4 hours PRN anxiety

## 2024-02-20 NOTE — BH INPATIENT PSYCHIATRY PROGRESS NOTE - OTHER
PD evident PD evident, even R side hyperkinesia at times more congruent "good".  pt narrative PD gait

## 2024-02-21 RX ORDER — GABAPENTIN 400 MG/1
600 CAPSULE ORAL AT BEDTIME
Refills: 0 | Status: DISCONTINUED | OUTPATIENT
Start: 2024-02-21 | End: 2024-02-27

## 2024-02-21 RX ORDER — QUETIAPINE FUMARATE 200 MG/1
50 TABLET, FILM COATED ORAL EVERY 4 HOURS
Refills: 0 | Status: DISCONTINUED | OUTPATIENT
Start: 2024-02-21 | End: 2024-02-27

## 2024-02-21 RX ORDER — QUETIAPINE FUMARATE 200 MG/1
25 TABLET, FILM COATED ORAL EVERY 4 HOURS
Refills: 0 | Status: DISCONTINUED | OUTPATIENT
Start: 2024-02-21 | End: 2024-02-27

## 2024-02-21 RX ORDER — CARBIDOPA AND LEVODOPA 25; 100 MG/1; MG/1
1 TABLET ORAL DAILY
Refills: 0 | Status: DISCONTINUED | OUTPATIENT
Start: 2024-02-21 | End: 2024-02-22

## 2024-02-21 RX ORDER — CARBIDOPA AND LEVODOPA 25; 100 MG/1; MG/1
1 TABLET ORAL
Refills: 0 | Status: DISCONTINUED | OUTPATIENT
Start: 2024-02-22 | End: 2024-02-22

## 2024-02-21 RX ORDER — CARBIDOPA AND LEVODOPA 25; 100 MG/1; MG/1
1 TABLET ORAL
Refills: 0 | Status: DISCONTINUED | OUTPATIENT
Start: 2024-02-21 | End: 2024-02-22

## 2024-02-21 RX ADMIN — PRAMIPEXOLE DIHYDROCHLORIDE 0.5 MILLIGRAM(S): 0.12 TABLET ORAL at 08:27

## 2024-02-21 RX ADMIN — PRAMIPEXOLE DIHYDROCHLORIDE 0.5 MILLIGRAM(S): 0.12 TABLET ORAL at 20:03

## 2024-02-21 RX ADMIN — CARBIDOPA AND LEVODOPA 1 TABLET(S): 25; 100 TABLET ORAL at 09:35

## 2024-02-21 RX ADMIN — CARBIDOPA AND LEVODOPA 1 TABLET(S): 25; 100 TABLET ORAL at 06:03

## 2024-02-21 RX ADMIN — CARBIDOPA AND LEVODOPA 1 TABLET(S): 25; 100 TABLET ORAL at 22:32

## 2024-02-21 RX ADMIN — Medication 10 MILLIGRAM(S): at 04:36

## 2024-02-21 RX ADMIN — CARBIDOPA AND LEVODOPA 1 TABLET(S): 25; 100 TABLET ORAL at 12:19

## 2024-02-21 RX ADMIN — CARBIDOPA AND LEVODOPA 1 TABLET(S): 25; 100 TABLET ORAL at 16:09

## 2024-02-21 RX ADMIN — RASAGILINE 1 MILLIGRAM(S): 0.5 TABLET ORAL at 08:27

## 2024-02-21 RX ADMIN — GABAPENTIN 400 MILLIGRAM(S): 400 CAPSULE ORAL at 17:52

## 2024-02-21 RX ADMIN — CARBIDOPA AND LEVODOPA 1 TABLET(S): 25; 100 TABLET ORAL at 18:04

## 2024-02-21 RX ADMIN — GABAPENTIN 600 MILLIGRAM(S): 400 CAPSULE ORAL at 20:03

## 2024-02-21 NOTE — BH INPATIENT PSYCHIATRY PROGRESS NOTE - NSBHASSESSSUMMFT_PSY_ALL_CORE
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    PLAN  2PC admit  Q15 adequate  PT consult  Psych:  c/w seroquel 150 qhs now stopped  continue rasagiline 1 qd  Comorbids:  continue carbi/levo 25/100--changed to qid on 2/17 and now 5x daily IR followed by 4x CR  continue pramipexole 0.5 BID  PRNs: seroquel 12.5 and 100 PO PRNs  PAIN MGMT: ketorolac 15 IM x10 doses PRN max over 5 days as BID  PO 10 mg q6 ketorolac  neurontin 300 QID PRN for back pain and anxiety-- increased to 400 mg qid prn  Family collateral  NEURO collateral  NEURO consult initiated  G&M therapy  Supportive therapy as tolerated  DISPO TBD

## 2024-02-21 NOTE — CHART NOTE - NSCHARTNOTEFT_GEN_A_CORE
Patient interviewed and examined with Dr. Conrad. Case discussed with Dr. Hull, and with the patient's outpatient neurologist Dr. Eulalio Mccormick [phone]. Full consult note to follow.    Preliminary recommendations:    1) If feasible, discontinue quetiapine.  2) Change regimen of carbidopa/levodopa to 1 tablet of immediate release 25/100 mg at 7 AM, 1 tablet of extended release 25/100 mg at 11 AM, 3 PM, 7 PM, and 11 PM, and as needed 1 tablet of immediate release 25/100 mg during the night (around 3 AM).  3) Continue current doses of pramipexole and rasagiline.  4) Continue current dose of gabapentin.     Mauri Roy M.D.  [id number]  [facility] License # [id number]  NPI # [id number]

## 2024-02-21 NOTE — BH INPATIENT PSYCHIATRY PROGRESS NOTE - OTHER
PD evident, even R side hyperkinesia at times PD evident, PD tremor and hyperkinetic LID type movements, at times PD evident more congruent PD gait "good".  pt narrative

## 2024-02-21 NOTE — BH INPATIENT PSYCHIATRY PROGRESS NOTE - NSBHFUPINTERVALHXFT_PSY_A_CORE
f/up psychosis, care discussed w/ nursing, VSS but anxious with hyperkinesia evident at times.  Patient observed sitting in his room, appeared to be comfortable, very focused on sinemet dosing and needing increased sinemet despite recent increase and agrees to NEURO consult today. Reported better relief after taking Toradol IM from his pain, but also feels gabapentin helping and encouraged further trial.  denied SHIIP and AVTH.  Reported sleep improving with Seroquel increase and with good appetite but possible worsening of PD symptoms 2/2 seroquel.  Lengthy f/u with NEURO and plan is to stop seroquel.  Hence we will trial neurontin also for sleep at higher dose.  Pt denied dizziness but staff notes pt on floor at times, needs help to sit on bed from floor.  Repeats that he was not telling his neurologist Dr. Saeed that he was self medicating with the Sinemet but agrees to give complete hx to NEURO team.  Endorses now that using adderall was a bad idea and unsafe.  Writer followed up at length with NEURO team in PM.  New Schedule with IR and CR sinemet created and pt pleased.  Pt tells md he placed 72h letter but pt noted to be INVOL not VOL.  Encouraged patience and meeting with wife.  Will attempt collateral from wife via phone and possible MON meeting.  No new SEs reported or observed. Some rapport with his MD and does enjoy attention from MD.

## 2024-02-21 NOTE — BH INPATIENT PSYCHIATRY PROGRESS NOTE - CURRENT MEDICATION
MEDICATIONS  (STANDING):  carbidopa/levodopa CR 25/100 1 Tablet(s) Oral <User Schedule>  gabapentin 600 milliGRAM(s) Oral at bedtime  pramipexole 0.5 milliGRAM(s) Oral two times a day  rasagiline Tablet 1 milliGRAM(s) Oral daily    MEDICATIONS  (PRN):  carbidopa/levodopa  25/100 1 Tablet(s) Oral daily PRN PD  carbidopa/levodopa  25/100 1 Tablet(s) Oral daily PRN PD  gabapentin 400 milliGRAM(s) Oral four times a day PRN neuropathic pain/anxiety  lidocaine   4% Patch 1 Patch Transdermal daily PRN Lumbago  QUEtiapine 50 milliGRAM(s) Oral every 4 hours PRN severe agitation/aggression  QUEtiapine 25 milliGRAM(s) Oral every 4 hours PRN anxiety

## 2024-02-22 PROCEDURE — 90853 GROUP PSYCHOTHERAPY: CPT

## 2024-02-22 RX ORDER — CARBIDOPA AND LEVODOPA 25; 100 MG/1; MG/1
1 TABLET ORAL
Refills: 0 | Status: DISCONTINUED | OUTPATIENT
Start: 2024-02-22 | End: 2024-02-22

## 2024-02-22 RX ORDER — CARBIDOPA AND LEVODOPA 25; 100 MG/1; MG/1
1 TABLET ORAL DAILY
Refills: 0 | Status: COMPLETED | OUTPATIENT
Start: 2024-02-22 | End: 2024-02-23

## 2024-02-22 RX ORDER — CARBIDOPA AND LEVODOPA 25; 100 MG/1; MG/1
1 TABLET ORAL
Refills: 0 | Status: DISCONTINUED | OUTPATIENT
Start: 2024-02-23 | End: 2024-02-23

## 2024-02-22 RX ORDER — CARBIDOPA AND LEVODOPA 25; 100 MG/1; MG/1
1 TABLET ORAL
Refills: 0 | Status: COMPLETED | OUTPATIENT
Start: 2024-02-22 | End: 2024-02-22

## 2024-02-22 RX ORDER — CARBIDOPA AND LEVODOPA 25; 100 MG/1; MG/1
1 TABLET ORAL DAILY
Refills: 0 | Status: DISCONTINUED | OUTPATIENT
Start: 2024-02-23 | End: 2024-02-27

## 2024-02-22 RX ADMIN — CARBIDOPA AND LEVODOPA 1 TABLET(S): 25; 100 TABLET ORAL at 03:40

## 2024-02-22 RX ADMIN — PRAMIPEXOLE DIHYDROCHLORIDE 0.5 MILLIGRAM(S): 0.12 TABLET ORAL at 08:25

## 2024-02-22 RX ADMIN — CARBIDOPA AND LEVODOPA 1 TABLET(S): 25; 100 TABLET ORAL at 06:06

## 2024-02-22 RX ADMIN — PRAMIPEXOLE DIHYDROCHLORIDE 0.5 MILLIGRAM(S): 0.12 TABLET ORAL at 20:37

## 2024-02-22 RX ADMIN — CARBIDOPA AND LEVODOPA 1 TABLET(S): 25; 100 TABLET ORAL at 11:05

## 2024-02-22 RX ADMIN — RASAGILINE 1 MILLIGRAM(S): 0.5 TABLET ORAL at 08:25

## 2024-02-22 RX ADMIN — CARBIDOPA AND LEVODOPA 1 TABLET(S): 25; 100 TABLET ORAL at 14:58

## 2024-02-22 RX ADMIN — CARBIDOPA AND LEVODOPA 1 TABLET(S): 25; 100 TABLET ORAL at 21:53

## 2024-02-22 RX ADMIN — CARBIDOPA AND LEVODOPA 1 TABLET(S): 25; 100 TABLET ORAL at 18:57

## 2024-02-22 RX ADMIN — GABAPENTIN 600 MILLIGRAM(S): 400 CAPSULE ORAL at 20:37

## 2024-02-22 RX ADMIN — GABAPENTIN 400 MILLIGRAM(S): 400 CAPSULE ORAL at 11:16

## 2024-02-22 RX ADMIN — GABAPENTIN 400 MILLIGRAM(S): 400 CAPSULE ORAL at 18:30

## 2024-02-22 NOTE — CHART NOTE - NSCHARTNOTEFT_GEN_A_CORE
Name: Vamshi Bowers	            Date: 2024  MRN: [id number]	                                     Time: 900  : 1975    Referring Clinician: Flavio Hull  	  Source of information: Patient    Reason for Consult: Parkinson's disease medication regimen optimization     History of Present Illness: The patient is a 48-year-old right-handed man with a past psychiatric history of opioid use disorder with one rehab stay in , and no prior psychiatric hospitalizations, a past medical history of Parkinson's disease diagnosed a few years ago, no past surgical history, who presented to [facility] ED BIB EMS activated by wife due to paranoid ideation in the setting of PD medication overuse and abuse of stimulants and suboxone obtained from friend, was admitted to [facility] on 2024 on 9.27 status.     During this hospitalization,¬¬¬ patient has complained of back pain and sciatica and was started on gabapentin 400mg 4 times per day PRN with good effect. Patient has also been offered ketorolac PRN with intermittent use and good effect. He has not exhibited SI/HI/AVH and his paranoia is apparently resolving.     Neurology was consulted to evaluate the patient for advice on medication management in the setting of PD medication overuse. Per the Primary psychiatric team, the patient has complained that his carbidopa-levodopa is wearing off too quickly and he has been requesting more.     Upon initial interview, patient is tearful and admits feeling ashamed for “self-medicating” ever since he received his PD diagnosis to manage unbearable symptoms of pain, stiffness, and anxiety. He notes that he struggled with opioid use disorder for about 20 years but attended an inpatient rehab in  and has been mostly off of opioids since that time. However, he notes that recently he began obtaining unprescribed Suboxone and Adderall from a friend. 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 He states that the pain was typically located at the sacrum and at times would radiate down his right leg to the bottom of the right foot. He states that he last used suboxone on 2024, prior to this admission. He reports poor sleep due to anxiety at night and feelings of lethargy and stiff movement in the mornings. He states that he has been using up to 30mg of Adderall per day to help him “get energy” in the mornings while his Sinemet is still kicking in. He reports that since starting gabapentin upon this admission, he feels his pain has greatly improved. He now rates his pain 0/10. He believes that his PD symptoms of stiffness and shuffling gait have gotten worse over the years since his diagnosis. He reports that he was initially stockpiling Sinemet and not taking it due to nausea side effects, but as his symptoms worsened, he began taking it more frequently. He notes that he used to be on an extended release version of carbidopa/levodopa (Rytary, believes dose was “middle strength” which would be 48.75mg/195mg) which he took TID. This medication controlled his symptoms well, but his insurance did not cover the medication and he stopped being able to afford it. He notes that he has not been working much due to his PD symptoms and is now applying for disability. He states that upon switching to the generic, immediate release Sinemet, he noticed that the medication was wearing off too quickly, leading him to experience more “off” time with worsened symptoms. He states that the medication kicks in about 20-30 minutes after taking it and lasts about 2 hours, at which point he begins feeling stiff again and describes “horrible anxiety and feeling paralyzed.” He reports self-dosing his Sinemet up to as many as 6-8 times per day. He notes that he can feel when the medication kicks in because his right leg starts twitching and he can suddenly walk well. He states that he wants to stop using unprescribed substances and believes he can stop taking opioids now that he is on gabapentin. However, he still believes that his Sinemet dosing is inadequate (currently at  5x/day while awake) and states that he hopes to find a happy medium where he can experience minimal PD symptoms but not experience the unintended effects of paranoia or dyskinesia.     He reports that he is independent with his ADLs and denies cognitive decline such as memory loss, confusion, difficulty finding words, getting lost. Denies recent falls but notes that he “slid down some stairs” the day before presenting to ED which he believes may have been due to the medications he was taking, unsure if experienced head strike, but does not think so. CT head done in ED was negative. He also denies overusing his other PD medications, pramipexole and rasagiline, which he believes were added to his regimen very early on after his diagnosis.     He gives permission to share the above information with his outpatient neurologist, Dr. Eulalio Mccormick. Case discussed with Dr. Mccormick, who feels that Rytary was the best option for this patient and is appealing to get insurance coverage for this medication. However, he is in agreement with our proposed interim changes to PD medication regimen as noted in assessment and plan.        Allergies: NKDA    Current Medications:  MEDICATIONS  (STANDING):  carbidopa/levodopa 25/100 1 Tablet(s) Oral 5x/day (every 3 hours between 0700 and 1900)  pramipexole 0.5 milliGRAM(s) Oral two times a day  quetiapine 100 milliGRAM(s) Oral at bedtime  rasagiline Tablet 1 milliGRAM(s) Oral daily    MEDICATIONS  (PRN):  gabapentin 400 milliGRAM(s) Oral four times a day PRN neuropathic pain/anxiety  ketorolac 10 milliGRAM(s) Oral every 6 hours PRN pain  ketorolac   Injectable 15 milliGRAM(s) IntraMuscular two times a day PRN back pain/sciatica  lidocaine   4% Patch 1 Patch Transdermal daily PRN Lumbago  QUEtiapine 25 milliGRAM(s) Oral every 4 hours PRN severe anxiety or agitation or aggression  QUEtiapine 100 milliGRAM(s) Oral every 4 hours PRN severe agitation/aggressionStanding:     Past Medical History: Parkinson’s Disease, low back pain, sciatica    Past Surgical History: None    Past Psychiatric History:  -	Charted Diagnoses: Opioid use disorder  -	Known Hospitalizations/Consultation Liaison Psychiatry Involvement: seen by  in ED prior to this admission (2024)  -	Last Reported Outpatient Psychiatric Visit: none    Family History: None known (patient denies family history of parkinsonism, seizure disorders, brain malignancy, dementia, and headache disorders).    Social History: The patient currently lives in [place] with wife, 17-year-old son with autism spectrum disorder, and in-laws. 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 Reports abuse of Adderall and Suboxone, but denies other substance or alcohol use. Is high school educated.      ROS:   Constitutional: Denies fevers, chills, recent weight loss or gain, and night sweats.  HEENT: Denies headaches, changes in vision, and changes in hearing.  Respiratory: Denies shortness of breath and cough.  Cardiac: Denies chest pain and palpitations.  Gastrointestinal: Denies any new change in bowel movement frequency but reports occasional constipation chronically. Denies any GI upset, nausea, and vomiting.  Genitourinary: Denies dysuria, hematuria, changes in urinary frequency, and incontinence.  Musculoskeletal: Denies muscle aches and joint pain.   Neuro: Denies weakness, paresthesia, recent falls, seizures, and dizziness. Reports stiffness/rigidity, shuffling gait worst when PD medications wearing off.   Skin: Denies new rashes or lesions.     Physical Exam:  VS: T 36.8 °F, HR 99, /87  Gen: Well-developed, alert and in no acute distress.   HEENT: Normocephalic and atraumatic.  Hypomimia.  Neck: Supple with full range of motion.  CV: Regular rate & rhythm. Normal S1/S2. No murmurs, rubs, or gallops.  Pulmonary: Clear to auscultation bilaterally.   Abdomen: Soft, nontender, and nondistended. Normoactive bowel sounds.   Back: No spinal deviation noted. No spinous or costovertebral angle tenderness.  Ext: No edema of distal upper or lower extremities. No clubbing or cyanosis of digits.    Neurological Exam:   Note – patient was assessed both prior to Sinemet taking effect (when “off”), and after Sinemet taking effect (when “on”). Both exams elaborated where relevant below.    MSE:  Patient was alert, calm, cooperative, in good behavioral control. Eye contact appropriate, speech unremarkable. Notable psychomotor agitation during interview, anxious affect with somewhat labile, full range. Appeared to have uncontrolled movements of right upper and lower extremities.     MMSE: 30/30  Orientation:  -	Time:    -	Location:    Registration: 3/3  Attention:  (spell WORLD backwards)  Recall: 3/3  Namin/2   Repetition:    3-Stage Command: 3/3   Written Instruction:   Written Sentence:   Copying intersecting pentagons:     CN II – Pupils are equal, round, and reactive to light and accommodation. Peripheral fields intact bilaterally by confrontation. Disc margins sharp.  CN III, IV, VI – Extraocular movements intact without nystagmus.  CN V – V1-V3 intact to light touch and temperature.  CN VII – No facial asymmetry. Able to smile, raise eyebrows, close eyes against resistance, and puff cheeks against resistance symmetrically and bilaterally.  CN VIII – Hearing intact bilaterally to finger rub.  CN IX, X – Palate elevates symmetrically bilaterally. Uvula is midline.  CN XI – Shoulder shrug and head turn intact and symmetric bilaterally with good power.  CN XII – Tongue midline without deviation.     Sensory:  Temperature, light touch, and vibration sense intact in upper and lower extremities bilaterally.     Reflexes:   	B-radialis	Biceps	Triceps	Patellar	Ankle	Plantar  Right	2+	2+	2+	2+	2+	Flexor  Left	2+	2+	2+	2+	2+	Flexor  	Glabellar sign absent.    Motor:   “Off” exam: Normal bulk. Strength 5/5 in upper and lower extremities. No pronator drift. Mild cogwheel rigidity bilaterally, worse in RUE.  Bradykinesia, more prominent on the right. No tremor, asterixis, or myoclonus noted.  No evident dyskinesia.    “On” exam: Normal bulk and tone. Strength 5/5 in upper and lower extremities. No pronator drift. Mild cogwheel rigidity at the right wrist.  Minimal bradykinesia on the right, much improved as compared with "off" exam. No tremor, asterixis, or myoclonus noted.  Moderate dyskinesia of the right upper and lower extremities, with occasional dystonic posturing of the right upper extremity.  Mild intermittent dyskinesia of the left lower extremity.    Coordination:   “Off” exam: Finger-nose-finger intact bilaterally with no dysmetria.  Upper extremity rapid alternating movements slower on the right.  Finger and toe tapping slow and discontinuous (R worse than L)    “On” exam: Finger-nose-finger intact bilaterally with no dysmetria.  Upper extremity rapid alternating movements intact.  Finger tapping and toe tapping slightly slower on the right.    Gait:   “Off” exam: Slowed gait with moderate shuffling/dragging of RLE, with diminished arm swing on the right.  Able to stand on toes, slight difficulty standing on heels.     “On” exam: Unassisted regular gait with narrow base, no shuffling.  Normal  arm swing, with superimposed dyskinetic/dystonic movements of the right upper extremity. Able to stand on heels and toes. Able to perform tandem gait but noted to extend right arm backwards during this exam. No Romberg sign. Postural reflexes intact by pull test.       Labs:   : WBC 6.7, RBC 4.5 Hgb 13.1, Hct 38.0, Platelets 242, Na 140, K 3.0 (day prior 3.5), Cl 103, CO2 26, BUN 11.6, Cr 0.91 Glucose 135, Ca 9.0, TSH 3.18, UA negative, Utox positive for amphetamines    [id number] CT head non-contrast (images reviewed on [facility] PACS) – negative for acute findings  [id number] CXR (images reviewed on [facility] PACS) – negative for acute findings    Assessment:  The patient is a 48-year-old right-handed man with a past psychiatric history of opioid use disorder with one rehab stay in , and no prior psychiatric hospitalizations, a past medical history of Parkinson's disease diagnosed a few years ago, no past surgical history, who presented to [facility] ED BIB EMS activated by wife due to paranoid ideation in the setting of PD medication overuse and abuse of stimulants and suboxone obtained from friend, was admitted to [facility] on 2024 on  status and neurology was consulted for symptom management and medication regimen advice. Overuse of Sinemet in combination with pramipexole, rasagiline, and Adderall likely caused his psychotic symptoms. The patient is noted to have much more prominent parkinsonism when “off” of his Sinemet, with bradykinesia (worse on R side), cogwheel rigidity, and stiff gait, and when “on” he has levodopa induced dyskinesia but overall much improved PD symptoms.  He is substantially more distressed by his "off" symptoms then by his dyskinesia.    The patient is now reporting greatly relieved pain after starting gabapentin 400mg 4 times per day PRN. He also appears to have low tolerance for distress and discomfort leading him to request PRN medications, reports he has been requesting ketorolac for anxiety related to PD-related movement difficulty rather than for true pain.     Recommendations:    1) If feasible, discontinue quetiapine.  2) Change regimen of carbidopa/levodopa to 1 tablet of immediate release 25/100 mg at 7 AM, 1 tablet of extended release 25/100 mg at 11 AM, 3 PM, 7 PM, and 11 PM, and as needed 1 tablet of immediate release 25/100 mg during the night (between 1 and 5 AM).  3) Continue current doses of pramipexole and rasagiline.  4) Continue current dose of gabapentin.  5)  For breakthrough pain, okay to continue ketorolac 10mg PO q6h PRN but would try to encourage use of other PRNs first (such as sleep-specific PRNs or PRN carbidopa/levodopa if anxiety related to PD is the reason for his discomfort). Would discontinue IM ketorolac and consider switching patient from ketorolac to ibuprofen or Tylenol eventually (as tolerated), since long term use of ketorolac is not recommended.    Apryl Conrad M.D.   [id number]    I performed a history and physical examination of the patient and discussed the management with Dr. Conrad. [facility]. I reviewed Dr. Conrad’s note and agree with the documented findings and plan of care. Findings and recommendations discussed with Dr. Hull.    Mauri Roy M.D.  [phone]  [facility] License # [id number]  NPI # [id number] Name: Vamshi Bowers	            Date: 2024  MRN: [id number]	                                     Time: 900  : 1975    Referring Clinician: Flavio Hull  	  Source of information: Patient    Reason for Consult: Parkinson's disease medication regimen optimization     History of Present Illness: The patient is a 48-year-old right-handed man with a past psychiatric history of opioid use disorder with one rehab stay in , and no prior psychiatric hospitalizations, a past medical history of Parkinson's disease diagnosed a few years ago, no past surgical history, who presented to [facility] ED BIB EMS activated by wife due to paranoid ideation in the setting of PD medication overuse and abuse of stimulants and suboxone obtained from friend, was admitted to [facility] on 2024 on 9.27 status.     During this hospitalization, the patient has complained of back pain and sciatica and was started on gabapentin 400mg 4 times per day PRN with good effect. Patient has also been offered ketorolac PRN with intermittent use and good effect. He has not exhibited SI/HI/AVH and his paranoia is apparently resolving.     Neurology was consulted to evaluate the patient for advice on medication management in the setting of PD medication overuse. Per the Primary psychiatric team, the patient has complained that his carbidopa-levodopa is wearing off too quickly and he has been requesting more.     Upon initial interview, patient is tearful and admits feeling ashamed for “self-medicating” ever since he received his PD diagnosis to manage unbearable symptoms of pain, stiffness, and anxiety. He notes that he struggled with opioid use disorder for about 20 years but attended an inpatient rehab in  and has been mostly off of opioids since that time. However, he notes that recently he began obtaining unprescribed Suboxone and Adderall from a friend. 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 He states that the pain was typically located at the sacrum and at times would radiate down his right leg to the bottom of the right foot. He states that he last used suboxone on 2024, prior to this admission. He reports poor sleep due to anxiety at night and feelings of lethargy and stiff movement in the mornings. He states that he has been using up to 30mg of Adderall per day to help him “get energy” in the mornings while his Sinemet is still kicking in. He reports that since starting gabapentin upon this admission, he feels his pain has greatly improved. He now rates his pain 0/10. He believes that his PD symptoms of stiffness and shuffling gait have gotten worse over the years since his diagnosis. He reports that he was initially stockpiling Sinemet and not taking it due to nausea side effects, but as his symptoms worsened, he began taking it more frequently. He notes that he used to be on an extended release version of carbidopa/levodopa (Rytary, believes dose was “middle strength” which would be 48.75mg/195mg) which he took TID. This medication controlled his symptoms well, but his insurance did not cover the medication and he stopped being able to afford it. He notes that he has not been working much due to his PD symptoms and is now applying for disability. He states that upon switching to the generic, immediate release Sinemet, he noticed that the medication was wearing off too quickly, leading him to experience more “off” time with worsened symptoms. He states that the medication kicks in about 20-30 minutes after taking it and lasts about 2 hours, at which point he begins feeling stiff again and describes “horrible anxiety and feeling paralyzed.” He reports self-dosing his Sinemet up to as many as 6-8 times per day. He notes that he can feel when the medication kicks in because his right leg starts twitching and he can suddenly walk well. He states that he wants to stop using unprescribed substances and believes he can stop taking opioids now that he is on gabapentin. However, he still believes that his Sinemet dosing is inadequate (currently at  5x/day while awake) and states that he hopes to find a happy medium where he can experience minimal PD symptoms but not experience the unintended effects of paranoia or dyskinesia.     He reports that he is independent with his ADLs and denies cognitive decline such as memory loss, confusion, difficulty finding words, getting lost. Denies recent falls but notes that he “slid down some stairs” the day before presenting to ED which he believes may have been due to the medications he was taking, unsure if experienced head strike, but does not think so. CT head done in ED was negative. He also denies overusing his other PD medications, pramipexole and rasagiline, which he believes were added to his regimen very early on after his diagnosis.     He gives permission to share the above information with his outpatient neurologist, Dr. Eulalio Mccormick. Case discussed with Dr. Mccormick, who feels that Rytary was the best option for this patient and is appealing to get insurance coverage for this medication. However, he is in agreement with our proposed interim changes to PD medication regimen as noted in assessment and plan.        Allergies: NKDA    Current Medications:  MEDICATIONS  (STANDING):  carbidopa/levodopa 25/100 1 Tablet(s) Oral 5x/day (every 3 hours between 0700 and 1900)  pramipexole 0.5 milliGRAM(s) Oral two times a day  quetiapine 100 milliGRAM(s) Oral at bedtime  rasagiline Tablet 1 milliGRAM(s) Oral daily    MEDICATIONS  (PRN):  gabapentin 400 milliGRAM(s) Oral four times a day PRN neuropathic pain/anxiety  ketorolac 10 milliGRAM(s) Oral every 6 hours PRN pain  ketorolac   Injectable 15 milliGRAM(s) IntraMuscular two times a day PRN back pain/sciatica  lidocaine   4% Patch 1 Patch Transdermal daily PRN Lumbago  QUEtiapine 25 milliGRAM(s) Oral every 4 hours PRN severe anxiety or agitation or aggression  QUEtiapine 100 milliGRAM(s) Oral every 4 hours PRN severe agitation/aggressionStanding:     Past Medical History: Parkinson’s Disease, low back pain, sciatica    Past Surgical History: None    Past Psychiatric History:  -	Charted Diagnoses: Opioid use disorder  -	Known Hospitalizations/Consultation Liaison Psychiatry Involvement: seen by  in ED prior to this admission (2024)  -	Last Reported Outpatient Psychiatric Visit: none    Family History: None known (patient denies family history of parkinsonism, seizure disorders, brain malignancy, dementia, and headache disorders).    Social History: The patient currently lives in [place] with wife, 17-year-old son with autism spectrum disorder, and in-laws. 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 Reports abuse of Adderall and Suboxone, but denies other substance or alcohol use. Is high school educated.      ROS:   Constitutional: Denies fevers, chills, recent weight loss or gain, and night sweats.  HEENT: Denies headaches, changes in vision, and changes in hearing.  Respiratory: Denies shortness of breath and cough.  Cardiac: Denies chest pain and palpitations.  Gastrointestinal: Denies any new change in bowel movement frequency but reports occasional constipation chronically. Denies any GI upset, nausea, and vomiting.  Genitourinary: Denies dysuria, hematuria, changes in urinary frequency, and incontinence.  Musculoskeletal: Denies muscle aches and joint pain.   Neuro: Denies weakness, paresthesia, recent falls, seizures, and dizziness. Reports stiffness/rigidity, shuffling gait worst when PD medications wearing off.   Skin: Denies new rashes or lesions.     Physical Exam:  VS: T 36.8 °F, HR 99, /87  Gen: Well-developed, alert and in no acute distress.   HEENT: Normocephalic and atraumatic.  Hypomimia.  Neck: Supple with full range of motion.  CV: Regular rate & rhythm. Normal S1/S2. No murmurs, rubs, or gallops.  Pulmonary: Clear to auscultation bilaterally.   Abdomen: Soft, nontender, and nondistended. Normoactive bowel sounds.   Back: No spinal deviation noted. No spinous or costovertebral angle tenderness.  Ext: No edema of distal upper or lower extremities. No clubbing or cyanosis of digits.    Neurological Exam:   Note – patient was assessed both prior to Sinemet taking effect (when “off”), and after Sinemet taking effect (when “on”). Both exams elaborated where relevant below.    MSE:  Patient was alert, calm, cooperative, in good behavioral control. Eye contact appropriate, speech unremarkable. Notable psychomotor agitation during interview, anxious affect with somewhat labile, full range. Appeared to have uncontrolled movements of right upper and lower extremities.     MMSE: 30/30  Orientation:  -	Time:    -	Location:    Registration: 3/3  Attention:  (spell WORLD backwards)  Recall: 3/3  Namin/2   Repetition:    3-Stage Command: 3/3   Written Instruction:   Written Sentence:   Copying intersecting pentagons:     CN II – Pupils are equal, round, and reactive to light and accommodation. Peripheral fields intact bilaterally by confrontation. Disc margins sharp.  CN III, IV, VI – Extraocular movements intact without nystagmus.  CN V – V1-V3 intact to light touch and temperature.  CN VII – No facial asymmetry. Able to smile, raise eyebrows, close eyes against resistance, and puff cheeks against resistance symmetrically and bilaterally.  CN VIII – Hearing intact bilaterally to finger rub.  CN IX, X – Palate elevates symmetrically bilaterally. Uvula is midline.  CN XI – Shoulder shrug and head turn intact and symmetric bilaterally with good power.  CN XII – Tongue midline without deviation.     Sensory:  Temperature, light touch, and vibration sense intact in upper and lower extremities bilaterally.     Reflexes:   	B-radialis	Biceps	Triceps	Patellar	Ankle	Plantar  Right	2+	2+	2+	2+	2+	Flexor  Left	2+	2+	2+	2+	2+	Flexor  	Glabellar sign absent.    Motor:   “Off” exam: Normal bulk. Strength 5/5 in upper and lower extremities. No pronator drift. Mild cogwheel rigidity bilaterally, worse in RUE.  Bradykinesia, more prominent on the right. No tremor, asterixis, or myoclonus noted.  No evident dyskinesia.    “On” exam: Normal bulk and tone. Strength 5/5 in upper and lower extremities. No pronator drift. Mild cogwheel rigidity at the right wrist.  Minimal bradykinesia on the right, much improved as compared with "off" exam. No tremor, asterixis, or myoclonus noted.  Moderate dyskinesia of the right upper and lower extremities, with occasional dystonic posturing of the right upper extremity.  Mild intermittent dyskinesia of the left lower extremity.    Coordination:   “Off” exam: Finger-nose-finger intact bilaterally with no dysmetria.  Upper extremity rapid alternating movements slower on the right.  Finger and toe tapping slow and discontinuous (R worse than L)    “On” exam: Finger-nose-finger intact bilaterally with no dysmetria.  Upper extremity rapid alternating movements intact.  Finger tapping and toe tapping slightly slower on the right.    Gait:   “Off” exam: Slowed gait with moderate shuffling/dragging of RLE, with diminished arm swing on the right.  Able to stand on toes, slight difficulty standing on heels.     “On” exam: Unassisted regular gait with narrow base, no shuffling.  Normal  arm swing, with superimposed dyskinetic/dystonic movements of the right upper extremity. Able to stand on heels and toes. Able to perform tandem gait but noted to extend right arm backwards during this exam. No Romberg sign. Postural reflexes intact by pull test.       Labs:   : WBC 6.7, RBC 4.5 Hgb 13.1, Hct 38.0, Platelets 242, Na 140, K 3.0 (day prior 3.5), Cl 103, CO2 26, BUN 11.6, Cr 0.91 Glucose 135, Ca 9.0, TSH 3.18, UA negative, Utox positive for amphetamines    [id number] CT head non-contrast (images reviewed on [facility] PACS) – negative for acute findings  [id number] CXR (images reviewed on [facility] PACS) – negative for acute findings    Assessment:  The patient is a 48-year-old right-handed man with a past psychiatric history of opioid use disorder with one rehab stay in , and no prior psychiatric hospitalizations, a past medical history of Parkinson's disease diagnosed a few years ago, no past surgical history, who presented to [facility] ED BIB EMS activated by wife due to paranoid ideation in the setting of PD medication overuse and abuse of stimulants and suboxone obtained from friend, was admitted to [facility] on 2024 on  status and neurology was consulted for symptom management and medication regimen advice. Overuse of Sinemet in combination with pramipexole, rasagiline, and Adderall likely caused his psychotic symptoms. The patient is noted to have much more prominent parkinsonism when “off” of his Sinemet, with bradykinesia (worse on R side), cogwheel rigidity, and stiff gait, and when “on” he has levodopa induced dyskinesia but overall much improved PD symptoms.  He is substantially more distressed by his "off" symptoms then by his dyskinesia.    The patient is now reporting greatly relieved pain after starting gabapentin 400mg 4 times per day PRN. He also appears to have low tolerance for distress and discomfort leading him to request PRN medications, reports he has been requesting ketorolac for anxiety related to PD-related movement difficulty rather than for true pain.     Recommendations:    1) If feasible, discontinue quetiapine.  2) Change regimen of carbidopa/levodopa to 1 tablet of immediate release 25/100 mg at 7 AM, 1 tablet of extended release 25/100 mg at 11 AM, 3 PM, 7 PM, and 11 PM, and as needed 1 tablet of immediate release 25/100 mg during the night (between 1 and 5 AM).  3) Continue current doses of pramipexole and rasagiline.  4) Continue current dose of gabapentin.  5)  For breakthrough pain, okay to continue ketorolac 10mg PO q6h PRN but would try to encourage use of other PRNs first (such as sleep-specific PRNs or PRN carbidopa/levodopa if anxiety related to PD is the reason for his discomfort). Would discontinue IM ketorolac and consider switching patient from ketorolac to ibuprofen or Tylenol eventually (as tolerated), since long term use of ketorolac is not recommended.    Apryl Conrad M.D.   [phone]    I performed a history and physical examination of the patient and discussed the management with Dr. Conrad. Imaging was viewed in [facility] PACS. I reviewed Dr. Conrad’s note and agree with the documented findings and plan of care. Findings and recommendations discussed with Dr. Hull.    Mauri Roy M.D.  [phone]  [facility] License # [id number]  NPI # [id number]

## 2024-02-22 NOTE — BH INPATIENT PSYCHIATRY PROGRESS NOTE - NSBHFUPINTERVALHXFT_PSY_A_CORE
f/up psychosis, care discussed w/ nursing, VSS but anxious with hyperkinesia evident at times.  Patient observed sitting on floor and did not appear to be comfortable, asked mD for help to get up, with mildly dramatic flare, very focused on sinemet dosing and needing increased sinemet despite recent increase and agreed to NEURO consult WED. Reported better relief after taking Toradol IM from his pain, but also feels gabapentin helping and encouraged further trial but this is back pain not PD related.  denied SHIIP and AVTH. We made several plans throughout dday to change schedule of IR vs CR sinemet. Feels meds wear off to fast at 4h intervals. Much f/u with NEURO, and NEURO f/u with o/p provider who is away.  Reported sleep improving with Seroquel increase and with good appetite but possible worsening of PD symptoms 2/2 seroquel so NEURO requested stoppage and now neurontin used as effective sleep aid.  Pt denied dizziness but staff notes pt on floor at times, needs help to sit on bed from floor.  Repeats that he was not telling his neurologist Dr. Saeed that he was self medicating with the Sinemet but agrees to give complete hx to NEURO team.  Endorses now that using adderall was a bad idea and unsafe.  Writer followed up at length again with NEURO team in PM.  New Schedule with IR and CR sinemet revised by end of day as new trial and pt pleased.  Encouraged patience and meeting with wife on MON.  No new SEs reported or observed. Some rapport with his MD and does enjoy attention from MD.

## 2024-02-22 NOTE — BH INPATIENT PSYCHIATRY PROGRESS NOTE - NSBHASSESSSUMMFT_PSY_ALL_CORE
Statement Selected 48 year old man  domiciled hs educated non caregiver employed though applying for disability no formal psychiatric history (no mental health diagnoses no prior inpatient psychiatric hospitalizations no outpatient mental health follow up no prior suicide attempts or non suicidal self injurious behavior no instances of violence aggression violation of orders of protection), pmh parkinsons who was brought in by ems after police were called to his house for PI including locking self in bedroom and breaking window to flee when police were at his door.    PLAN  2PC admit  Q15 adequate  PT consult  Psych:  c/w seroquel 150 qhs now stopped  continue rasagiline 1 qd  Comorbids:  continue carbi/levo 25/100--changed to qid on 2/17 and now 1x IR followed by 5x daily CR followed by 1x IR PRN during sleep/night  continue pramipexole 0.5 BID  PRNs: seroquel 12.5 and 100 PO PRNs  PAIN MGMT: ketorolac 15 IM x10 doses PRN max over 5 days as BID-- now stopped  PO 10 mg q6 ketorolac  neurontin 300 QID PRN for back pain and anxiety-- increased to 400 mg qid prn  Family collateral  NEURO collateral  NEURO consult initiated  G&M therapy  Supportive therapy as tolerated  DISPO TBD

## 2024-02-22 NOTE — BH INPATIENT PSYCHIATRY PROGRESS NOTE - OTHER
more linear, very focused on PD issues at expense of all else more congruent almost solely PD PD evident PD evident, even R side hyperkinesia at times "good".  PD evident, PD tremor and hyperkinetic LID type movements, at times pt narrative PD gait disturbances very evident at times, hypo and hyperkinesia

## 2024-02-22 NOTE — BH INPATIENT PSYCHIATRY PROGRESS NOTE - CURRENT MEDICATION
MEDICATIONS  (STANDING):  carbidopa/levodopa  25/100 1 Tablet(s) Oral <User Schedule>  carbidopa/levodopa CR 25/100 1 Tablet(s) Oral <User Schedule>  gabapentin 600 milliGRAM(s) Oral at bedtime  pramipexole 0.5 milliGRAM(s) Oral two times a day  rasagiline Tablet 1 milliGRAM(s) Oral daily    MEDICATIONS  (PRN):  carbidopa/levodopa  25/100 1 Tablet(s) Oral daily PRN PD  gabapentin 400 milliGRAM(s) Oral four times a day PRN neuropathic pain/anxiety  lidocaine   4% Patch 1 Patch Transdermal daily PRN Lumbago  QUEtiapine 50 milliGRAM(s) Oral every 4 hours PRN severe agitation/aggression  QUEtiapine 25 milliGRAM(s) Oral every 4 hours PRN anxiety

## 2024-02-23 RX ORDER — CARBIDOPA AND LEVODOPA 25; 100 MG/1; MG/1
1 TABLET ORAL
Refills: 0 | Status: DISCONTINUED | OUTPATIENT
Start: 2024-02-23 | End: 2024-02-27

## 2024-02-23 RX ORDER — CARBIDOPA AND LEVODOPA 25; 100 MG/1; MG/1
1 TABLET ORAL ONCE
Refills: 0 | Status: COMPLETED | OUTPATIENT
Start: 2024-02-23 | End: 2024-02-23

## 2024-02-23 RX ORDER — CARBIDOPA AND LEVODOPA 25; 100 MG/1; MG/1
1 TABLET ORAL
Refills: 0 | Status: DISCONTINUED | OUTPATIENT
Start: 2024-02-23 | End: 2024-02-23

## 2024-02-23 RX ORDER — LITHIUM CARBONATE 300 MG/1
450 TABLET, EXTENDED RELEASE ORAL
Refills: 0 | Status: COMPLETED | OUTPATIENT
Start: 2024-02-23 | End: 2024-02-23

## 2024-02-23 RX ORDER — LITHIUM CARBONATE 300 MG/1
900 TABLET, EXTENDED RELEASE ORAL AT BEDTIME
Refills: 0 | Status: DISCONTINUED | OUTPATIENT
Start: 2024-02-24 | End: 2024-02-26

## 2024-02-23 RX ADMIN — CARBIDOPA AND LEVODOPA 1 TABLET(S): 25; 100 TABLET ORAL at 13:15

## 2024-02-23 RX ADMIN — CARBIDOPA AND LEVODOPA 1 TABLET(S): 25; 100 TABLET ORAL at 21:19

## 2024-02-23 RX ADMIN — GABAPENTIN 400 MILLIGRAM(S): 400 CAPSULE ORAL at 05:39

## 2024-02-23 RX ADMIN — CARBIDOPA AND LEVODOPA 1 TABLET(S): 25; 100 TABLET ORAL at 06:51

## 2024-02-23 RX ADMIN — CARBIDOPA AND LEVODOPA 1 TABLET(S): 25; 100 TABLET ORAL at 16:40

## 2024-02-23 RX ADMIN — GABAPENTIN 400 MILLIGRAM(S): 400 CAPSULE ORAL at 11:10

## 2024-02-23 RX ADMIN — CARBIDOPA AND LEVODOPA 1 TABLET(S): 25; 100 TABLET ORAL at 02:24

## 2024-02-23 RX ADMIN — PRAMIPEXOLE DIHYDROCHLORIDE 0.5 MILLIGRAM(S): 0.12 TABLET ORAL at 08:23

## 2024-02-23 RX ADMIN — CARBIDOPA AND LEVODOPA 1 TABLET(S): 25; 100 TABLET ORAL at 19:07

## 2024-02-23 RX ADMIN — PRAMIPEXOLE DIHYDROCHLORIDE 0.5 MILLIGRAM(S): 0.12 TABLET ORAL at 20:22

## 2024-02-23 RX ADMIN — CARBIDOPA AND LEVODOPA 1 TABLET(S): 25; 100 TABLET ORAL at 09:14

## 2024-02-23 RX ADMIN — GABAPENTIN 600 MILLIGRAM(S): 400 CAPSULE ORAL at 20:22

## 2024-02-23 RX ADMIN — LITHIUM CARBONATE 450 MILLIGRAM(S): 300 TABLET, EXTENDED RELEASE ORAL at 20:22

## 2024-02-23 RX ADMIN — RASAGILINE 1 MILLIGRAM(S): 0.5 TABLET ORAL at 08:23

## 2024-02-23 NOTE — BH INPATIENT PSYCHIATRY PROGRESS NOTE - OTHER
PD evident, PD tremor and hyperkinetic LID type movements, at times PD evident "good" except when PD meds not working then labile/dysphoric and depressed more congruent PD evident, even R side hyperkinesia at times pt narrative almost solely PD all attenuating more linear, very focused on PD issues at expense of all else PD gait disturbances very evident at times, hypo and hyperkinesia

## 2024-02-23 NOTE — BH INPATIENT PSYCHIATRY PROGRESS NOTE - NSBHASSESSSUMMFT_PSY_ALL_CORE
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    PLAN  2PC admit  Q15 adequate  PT consult  Psych:  c/w seroquel 150 qhs now stopped  continue rasagiline 1 qd  start lithium cr 450 mg qhs FRI and 900 qhs SAT  Comorbids:  continue carbi/levo 25/100--changed to qid on 2/17 and now 1x IR followed by 5x daily CR followed by 1x IR PRN during sleep/night  continue pramipexole 0.5 BID  PRNs: seroquel 12.5 and 100 PO PRNs  PAIN MGMT: ketorolac 15 IM x10 doses PRN max over 5 days as BID-- now stopped  PO 10 mg q6 ketorolac  neurontin 300 QID PRN for back pain and anxiety-- increased to 400 mg qid prn  Family collateral  NEURO collateral  NEURO consult initiated  G&M therapy  Supportive therapy as tolerated  DISPO TBD

## 2024-02-23 NOTE — BH TREATMENT PLAN - NSTXPAINGOAL_PSY_ALL_CORE
Will identify 1 coping skill that distracts from pain
Will identify 1 coping skill that distracts from pain

## 2024-02-23 NOTE — BH TREATMENT PLAN - NSCMSPTSTRENGTHS_PSY_ALL_CORE
Assertive/Expressive of emotions/Future/goal oriented/Positive attitude/Resourceful/Strong support system/Supportive family
Assertive/Expressive of emotions/Future/goal oriented/Positive attitude/Resourceful/Strong support system/Supportive family

## 2024-02-23 NOTE — BH TREATMENT PLAN - NSTXDISORGINTERMD_PSY_ALL_CORE
PSychopharm with supportive therapy and MI for substance use with addiction tx plan with optiomal PD mgmt and appropriate aftercare
PSychopharm with supportive therapy and MI for substance use with addiction tx plan with optiomal PD mgmt and appropriate aftercare

## 2024-02-23 NOTE — BH TREATMENT PLAN - NSTXPLANTHERAPYSESSIONSFT_PSY_ALL_CORE
02-23-24  Type of therapy: Other, Physical therapy  Type of session: Individual  Level of patient participation: Participates  --  Therapy conducted by: Other (specify), Physical therapy  Therapy Summary: Patient engaged in ambulation and back exercises to decrease sciatica pain    02-24-24  Type of therapy: Leisure development, Music therapy, Peer advocate  Type of session: Individual  Level of patient participation: Attentive  Duration of participation: 15 minutes  Therapy conducted by: Psych rehab  Therapy Summary: Writer met with patient to discuss their progress toward goal. Pt was cooperative and calm throughout the meeting. Pt reports he is doing fine, but has developed frustration over his stay here. Pt reports this Facility is not the best for him because of his Parkinson Disease. Pt reports there was a change in his medication and he believes that it is  making his Parkinsons Disease worse. Pt reports before coming to the hospital he was taking ten different medications and is currently taking six. Pt reports at night he has difficulty with moving (related to Parkinsons) and reports he gets anxious. Pt reports fearing he will roll over and fall off the bed ( Pt has been giving a bell in case assitance is needed). Pt reports he is compliant with his medication. Pt reports his appetite is not the best due to medication requirements. Pt is fairly kempt. Pt tries his best to be visible on the unit and also attends psych rehab groups.Pt is social amongst his peers. Pt Denies SI/HI/AH/VH. Writer will continue to engage pt regularly to determine progress toward goal.

## 2024-02-23 NOTE — BH TREATMENT PLAN - NSDCCRITERIA_PSY_ALL_CORE
cgi<=2  No SI or psychosos with euthymia
cgi<=2  No SI or psychosis with euthymia  mood stabilizer regimen

## 2024-02-23 NOTE — BH INPATIENT PSYCHIATRY PROGRESS NOTE - NSTXANXPROGRES_PSY_ALL_CORE
CLONAZEPAM 0.5MG    TAB  Last Written Prescription Date:  5/21/2019  Last Fill Quantity: 30,   # refills: 5  Last Office Visit: 9/5/2019  Future Office visit:       Routing refill request to provider for review/approval because:  Drug not on the [facility], P or [facility] refill protocol or controlled substance. Unable to complete prescription refill per RNMedication Refill Policy.................... Sanjuana Trujillo RN ....................  12/3/2019   3:19 PM           Improving

## 2024-02-23 NOTE — BH TREATMENT PLAN - NSTXDISORGINTERPR_PSY_ALL_CORE
Psychiatric Rehabilitation staff will continue to engage patient in 1:1 ssessions and groups as well as provide psychoeducation and support to facilitate progress towards goal.
Patient will be encouraged to attend daily groups and develop effective coping skills. Psych rehab staff will continue to meet with patient regularly in order to provide ongoing support and encouragement.

## 2024-02-23 NOTE — BH INPATIENT PSYCHIATRY PROGRESS NOTE - NSBHFUPINTERVALHXFT_PSY_A_CORE
f/up psychosis, care discussed w/ nursing, VSS but anxious with hyperkinesia evident at times.  remains very focused on sinemet dosing and needing increased sinemet despite recent increase and agreed to NEURO consult WED. Reported better relief after taking sinement on more frequent schedule but also feels gabapentin helping and encouraged further trial but this is back pain not PD related.  denied SHIIP and AVTH. We made further changes with NEURO input to schedule of IR vs CR sinemet. Feels meds wear off to fast at 4h intervals. Much f/u with NEURO, and NEURO f/u with o/p provider who is away, again today.  Reported sleep improving and NEURO requested stoppage of seroquel and now neurontin used as effective sleep aid.  Pt denied dizziness but staff notes pt on floor at times, needs help to sit on bed from floor.  Repeats that he was not telling his neurologist Dr. THOMASON that he was self medicating with the Sinemet but agreed to give complete hx to NEURO team.  Endorses now that using adderall was a bad idea and unsafe.  Writer followed up at length again with NEURO team in PM.  New Schedule with IR and CR sinemet revised by end of day as new trial and pt pleased.  Encouraged patience and meeting with wife on MON.  No new SEs reported or observed. Some rapport with his MD and does enjoy attention from MD.

## 2024-02-24 RX ADMIN — RASAGILINE 1 MILLIGRAM(S): 0.5 TABLET ORAL at 08:41

## 2024-02-24 RX ADMIN — CARBIDOPA AND LEVODOPA 1 TABLET(S): 25; 100 TABLET ORAL at 11:54

## 2024-02-24 RX ADMIN — CARBIDOPA AND LEVODOPA 1 TABLET(S): 25; 100 TABLET ORAL at 08:41

## 2024-02-24 RX ADMIN — GABAPENTIN 400 MILLIGRAM(S): 400 CAPSULE ORAL at 18:02

## 2024-02-24 RX ADMIN — PRAMIPEXOLE DIHYDROCHLORIDE 0.5 MILLIGRAM(S): 0.12 TABLET ORAL at 08:41

## 2024-02-24 RX ADMIN — CARBIDOPA AND LEVODOPA 1 TABLET(S): 25; 100 TABLET ORAL at 06:33

## 2024-02-24 RX ADMIN — PRAMIPEXOLE DIHYDROCHLORIDE 0.5 MILLIGRAM(S): 0.12 TABLET ORAL at 20:09

## 2024-02-24 RX ADMIN — LITHIUM CARBONATE 900 MILLIGRAM(S): 300 TABLET, EXTENDED RELEASE ORAL at 20:09

## 2024-02-24 RX ADMIN — CARBIDOPA AND LEVODOPA 1 TABLET(S): 25; 100 TABLET ORAL at 18:03

## 2024-02-24 RX ADMIN — GABAPENTIN 600 MILLIGRAM(S): 400 CAPSULE ORAL at 20:09

## 2024-02-24 RX ADMIN — GABAPENTIN 400 MILLIGRAM(S): 400 CAPSULE ORAL at 04:45

## 2024-02-24 RX ADMIN — CARBIDOPA AND LEVODOPA 1 TABLET(S): 25; 100 TABLET ORAL at 20:43

## 2024-02-24 RX ADMIN — CARBIDOPA AND LEVODOPA 1 TABLET(S): 25; 100 TABLET ORAL at 14:34

## 2024-02-24 RX ADMIN — CARBIDOPA AND LEVODOPA 1 TABLET(S): 25; 100 TABLET ORAL at 00:58

## 2024-02-25 RX ADMIN — CARBIDOPA AND LEVODOPA 1 TABLET(S): 25; 100 TABLET ORAL at 12:00

## 2024-02-25 RX ADMIN — CARBIDOPA AND LEVODOPA 1 TABLET(S): 25; 100 TABLET ORAL at 05:23

## 2024-02-25 RX ADMIN — LIDOCAINE 1 PATCH: 4 CREAM TOPICAL at 00:01

## 2024-02-25 RX ADMIN — CARBIDOPA AND LEVODOPA 1 TABLET(S): 25; 100 TABLET ORAL at 00:01

## 2024-02-25 RX ADMIN — CARBIDOPA AND LEVODOPA 1 TABLET(S): 25; 100 TABLET ORAL at 15:26

## 2024-02-25 RX ADMIN — GABAPENTIN 600 MILLIGRAM(S): 400 CAPSULE ORAL at 20:00

## 2024-02-25 RX ADMIN — PRAMIPEXOLE DIHYDROCHLORIDE 0.5 MILLIGRAM(S): 0.12 TABLET ORAL at 20:01

## 2024-02-25 RX ADMIN — CARBIDOPA AND LEVODOPA 1 TABLET(S): 25; 100 TABLET ORAL at 20:33

## 2024-02-25 RX ADMIN — CARBIDOPA AND LEVODOPA 1 TABLET(S): 25; 100 TABLET ORAL at 17:57

## 2024-02-25 RX ADMIN — CARBIDOPA AND LEVODOPA 1 TABLET(S): 25; 100 TABLET ORAL at 08:53

## 2024-02-25 RX ADMIN — RASAGILINE 1 MILLIGRAM(S): 0.5 TABLET ORAL at 08:52

## 2024-02-25 RX ADMIN — PRAMIPEXOLE DIHYDROCHLORIDE 0.5 MILLIGRAM(S): 0.12 TABLET ORAL at 08:53

## 2024-02-25 NOTE — BH CHART NOTE - NSEVENTNOTEFT_PSY_ALL_CORE
DYLAN called to evaluate complaints of L extremity weakness since yesterday night, already evaluated by PA to no acute concerns. Reviewed chart-- h/o PD, already followed by Dr. Roy neurologist. Patient resting comfortably in bed but anxious appearing. Reports mild L extremity weakness in LE since yesterday night with new L UE weakness this morning around 9 a.m. Denies vision changes, dysarthria, changes to baseline balance/gait, dizziness.     Physical Exam:  T(C): 36.4 (02-25-24 @ 09:38), Max: 36.4 (02-25-24 @ 09:38)  HR: --  BP: --  RR: --  SpO2: --    CONSTITUTIONAL: Well groomed, anxious  EYES: PERRL and symmetric, EOMI, No conjunctival or scleral injection, non-icteric, intact visual fields throughout, no new onset double or blurry vision  RESP: No respiratory distress, no use of accessory muscles  CV: RRR  NEURO: Symmetric smile and eyebrow raise. No dysarthria.   Strength 5/5 in upper extremities symmetrically b/l. 5/5 in R LE, 4/5 in L LE. Able to keep both hands in the air, no pronator drift. Mild cogwheel rigidity bilaterally, worse in RUE.  Bradykinesia unchanged.  Gait slowed gait with moderate shuffling/dragging of RLE, with diminished arm swing on the right, unchanged from exam by neuro 2/22.       A/P: PD patient followed by neurologist Dr. Roy inpatient with questionable new-onset L extremity weakness without corroborating exam findings on UE; LE strength 4/5 in L LE but unchanged gait from last neuro exam documented on 2/22, notably anxious and reassurance seeking throughout the exam, not currently meeting BE FAST criteria, no indication for acute management.    1. Patient and RN to notify DYLAN if new symptoms develop.   2. Defer PD management to neuro.  3. discussed with RN. DYLAN called to evaluate complaints of L extremity weakness since yesterday night, already evaluated by PA to no acute concerns. Reviewed chart-- h/o PD, already followed by Dr. Roy neurologist. Patient resting comfortably in bed but anxious appearing. Reports mild L extremity weakness in LE since yesterday night with new L UE weakness this morning around 9 a.m. Denies vision changes, dysarthria, changes to baseline balance/gait, dizziness.     Physical Exam:  T(C): 36.4 (02-25-24 @ 09:38), Max: 36.4 (02-25-24 @ 09:38)  HR: --  BP: --  RR: --  SpO2: --    CONSTITUTIONAL: Well groomed, anxious  EYES: PERRL and symmetric, EOMI, No conjunctival or scleral injection, non-icteric, intact visual fields throughout, no new onset double or blurry vision  RESP: No respiratory distress, no use of accessory muscles  CV: RRR  NEURO: Symmetric smile and eyebrow raise. No dysarthria.   Strength 5/5 in upper extremities symmetrically b/l. LE strength 5/5 b/l but L fatigues quicker than R. Able to keep both hands in the air, no pronator drift. Mild cogwheel rigidity bilaterally, worse in RUE.  Bradykinesia unchanged.  Gait slowed gait with moderate shuffling/dragging of RLE, with diminished arm swing on the right, unchanged from exam by neuro 2/22.       A/P: PD patient followed by neurologist Dr. Roy inpatient with questionable new-onset L extremity weakness without corroborating exam findings on UE; LE notable symmetric strength with L fatiguing quicker than R, and gait, balance unchanged from last neuro exam documented on 2/22, notably anxious and reassurance seeking throughout the exam, no indication for acute management.    1. Patient and RN to notify DLYAN if new symptoms develop.   2. Defer PD management to neuro.  3. discussed with RN.

## 2024-02-26 PROCEDURE — 99233 SBSQ HOSP IP/OBS HIGH 50: CPT

## 2024-02-26 RX ORDER — PRAMIPEXOLE DIHYDROCHLORIDE 0.12 MG/1
1 TABLET ORAL
Qty: 60 | Refills: 0
Start: 2024-02-26 | End: 2024-03-26

## 2024-02-26 RX ORDER — GABAPENTIN 400 MG/1
1 CAPSULE ORAL
Qty: 90 | Refills: 0
Start: 2024-02-26 | End: 2024-03-26

## 2024-02-26 RX ORDER — CARBIDOPA AND LEVODOPA 25; 100 MG/1; MG/1
1 TABLET ORAL
Qty: 30 | Refills: 0
Start: 2024-02-26 | End: 2024-03-26

## 2024-02-26 RX ORDER — RASAGILINE 0.5 MG/1
1 TABLET ORAL
Qty: 30 | Refills: 0
Start: 2024-02-26 | End: 2024-03-26

## 2024-02-26 RX ORDER — GABAPENTIN 400 MG/1
1 CAPSULE ORAL
Qty: 30 | Refills: 0
Start: 2024-02-26 | End: 2024-03-26

## 2024-02-26 RX ORDER — CARBIDOPA AND LEVODOPA 25; 100 MG/1; MG/1
1 TABLET ORAL
Qty: 180 | Refills: 0
Start: 2024-02-26 | End: 2024-03-26

## 2024-02-26 RX ORDER — CARBIDOPA AND LEVODOPA 25; 100 MG/1; MG/1
1 TABLET ORAL
Refills: 0 | DISCHARGE

## 2024-02-26 RX ADMIN — CARBIDOPA AND LEVODOPA 1 TABLET(S): 25; 100 TABLET ORAL at 20:15

## 2024-02-26 RX ADMIN — RASAGILINE 1 MILLIGRAM(S): 0.5 TABLET ORAL at 07:44

## 2024-02-26 RX ADMIN — CARBIDOPA AND LEVODOPA 1 TABLET(S): 25; 100 TABLET ORAL at 00:08

## 2024-02-26 RX ADMIN — CARBIDOPA AND LEVODOPA 1 TABLET(S): 25; 100 TABLET ORAL at 14:52

## 2024-02-26 RX ADMIN — CARBIDOPA AND LEVODOPA 1 TABLET(S): 25; 100 TABLET ORAL at 11:52

## 2024-02-26 RX ADMIN — CARBIDOPA AND LEVODOPA 1 TABLET(S): 25; 100 TABLET ORAL at 06:31

## 2024-02-26 RX ADMIN — CARBIDOPA AND LEVODOPA 1 TABLET(S): 25; 100 TABLET ORAL at 08:59

## 2024-02-26 RX ADMIN — CARBIDOPA AND LEVODOPA 1 TABLET(S): 25; 100 TABLET ORAL at 18:14

## 2024-02-26 RX ADMIN — GABAPENTIN 600 MILLIGRAM(S): 400 CAPSULE ORAL at 20:15

## 2024-02-26 RX ADMIN — GABAPENTIN 400 MILLIGRAM(S): 400 CAPSULE ORAL at 07:45

## 2024-02-26 RX ADMIN — PRAMIPEXOLE DIHYDROCHLORIDE 0.5 MILLIGRAM(S): 0.12 TABLET ORAL at 20:15

## 2024-02-26 RX ADMIN — GABAPENTIN 400 MILLIGRAM(S): 400 CAPSULE ORAL at 21:02

## 2024-02-26 RX ADMIN — PRAMIPEXOLE DIHYDROCHLORIDE 0.5 MILLIGRAM(S): 0.12 TABLET ORAL at 07:45

## 2024-02-26 NOTE — BH INPATIENT PSYCHIATRY PROGRESS NOTE - NSBHATTESTBILLING_PSY_A_CORE
56270-Nebifygcie OBS or IP - high complexity OR 50-79 mins
15593-Gyyyohqpiq OBS or IP - moderate complexity OR 35-49 mins
57245-Thoewyptns OBS or IP - high complexity OR 50-79 mins
78970-Bxyqzxrfsd OBS or IP - high complexity OR 50-79 mins
56708-Hjagalxzvl OBS or IP - moderate complexity OR 35-49 mins
49958-Zydrhhnrgk OBS or IP - high complexity OR 50-79 mins
38842-Nvzrpauieb OBS or IP - moderate complexity OR 35-49 mins
87958-Ijhpbzmuff OBS or IP - high complexity OR 50-79 mins

## 2024-02-26 NOTE — BH INPATIENT PSYCHIATRY PROGRESS NOTE - NSICDXBHPRIMARYDX_PSY_ALL_CORE
Psychosis, unspecified psychosis type   F29  

## 2024-02-26 NOTE — BH INPATIENT PSYCHIATRY PROGRESS NOTE - NSTXPSYCHOGOAL_PSY_ALL_CORE
Will engage in a 15 minute conversation with no irrational content

## 2024-02-26 NOTE — BH INPATIENT PSYCHIATRY PROGRESS NOTE - NSBHMSEKNOWHOW_PSY_ALL_CORE
Current Events/Educational attainment/Vocabulary/Other...
Current Events/Vocabulary
Current Events/Educational attainment/Vocabulary/Other...
Current Events/Vocabulary
Current Events/Vocabulary
Current Events/Educational attainment/Vocabulary/Other...

## 2024-02-26 NOTE — BH INPATIENT PSYCHIATRY DISCHARGE NOTE - MSE UNSTRUCTURED FT
47yo man with appropriate grooming and hygiene. Pt is calm and cooperative with good eye contact. Motor: +rigidity bilaterally (RUE > LUE), +bradykinesia, later observed dyskinesia greater on the right compared to the left. Speech a bit soft and monotone though normal in rate and spontaneous. Stated mood is "good." Affect is blunted, but full, able to smile.  TP is linear. TC without any delusions or paranoia, no hallucinations.  Denies SIIP and HIIP, +hopeful, preoccupied with PD symptoms. Insight is fair. Judgement is fair. Impulse control is intact.

## 2024-02-26 NOTE — BH INPATIENT PSYCHIATRY PROGRESS NOTE - NSTXANXGOAL_PSY_ALL_CORE
Be able to participate in activities despite lingering anxiety/panic

## 2024-02-26 NOTE — BH INPATIENT PSYCHIATRY PROGRESS NOTE - OTHER
"good".  more linear, very focused on PD issues at expense of all else PD evident, even R side hyperkinesia at times almost solely PD pt narrative PD evident, PD tremor and hyperkinetic LID type movements, at times PD evident PD gait disturbances very evident at times, hypo and hyperkinesia more congruent

## 2024-02-26 NOTE — BH PSYCHOLOGY - GROUP THERAPY NOTE - NSBHPSYCHOLPARTICIPCOMMENT_PSY_A_CORE FT
Patient was an active participant, alert and oriented throughout group. At check-in, pt shared that he feels most present when spending time with family. Pt listened to peers share their experiences and appeared engaged in group discussion. 
Patient was an active participant, alert and oriented throughout group. At check-in, patient shared that he is looking forward to spending time with his family after leaving the hospital. Patient participated in group discussion, and reflected on his own difficulty with accepting being in the hospital. During check out, patient shared that he struggles with vulnerability, and how he has always “had to be the strong one.” Patient expressed insight into feeling or being two things at once, and cited it is something he would like to work on. Throughout group, he demonstrated respect and listened to others.
Pt. engaged appropriately in group. Pt. was alert and oriented throughout group. During check-in, pt. shared that he wasn't sure what his favorite movie was. Pt. participated in the mindfulness exercise, engaging in a picture find puzzle on his own and then also with other group members. Pt. shared his reflections and listened as others shared their experiences.

## 2024-02-26 NOTE — BH INPATIENT PSYCHIATRY PROGRESS NOTE - NSBHMSEAFFRANGE_PSY_A_CORE
Blunted/Constricted
Constricted
Blunted/Constricted

## 2024-02-26 NOTE — BH INPATIENT PSYCHIATRY PROGRESS NOTE - MSE UNSTRUCTURED FT
47yo man with appropriate grooming and hygiene. Pt is calm and cooperative with good eye contact. Motor: +rigidity bilaterally (RUE > LUE), +bradykinesia, later observed dyskinesia greater on the right compared to the left. Speech a bit soft and monotone though normal in rate and spontaneous. Stated mood is "bad." Affect is a bit anxious, full. TP is linear, at times perseverative. TC without any erlin delusions or paranoia elicited, denies SIIP and HIIP, +hopeful, preoccupied with PD symptoms. No perceptual disturbances noted. Insight is fair. Judgement is fair. Impulse control is intact.

## 2024-02-26 NOTE — BH INPATIENT PSYCHIATRY DISCHARGE NOTE - HPI (INCLUDE ILLNESS QUALITY, SEVERITY, DURATION, TIMING, CONTEXT, MODIFYING FACTORS, ASSOCIATED SIGNS AND SYMPTOMS)
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    Pt was in ER 2/13 allegedly for AMS.  Had left home to go to store but ended up at courts as he knew police would be there that could protect him.  Patient was noted to be a poor historian, awake and aware of himself and surroundings however selective and responses.  Made vague remarks about being concerned about his son's safety and his wife being "manipulative" and possibly narcissistic.  Denies any acute or significant psychiatric issues concerns or complaints. Stable mood with no anhedonia or other depressive symptoms. Does not appear overtly manic. Denies any perceptual disturbances though may have ?chronic ?paranoid delusions. On interview now, pt somewhat disorganized saying he went mesha but was worried wife would be mad at him which made him more paranoid for past couple days.   He isn't sure how  got there but then they knocked on his bedroom door, he wasn't sure they were really police or people trying to hurt him so instead of opening door, broke window and ran out as he saw police in his yard in uniform that "could protect me".  Pt not able to explain why he needs protection, answering with "you see, it's like this, I don't want to answer as I worry the information will be used against me".    Does admit he has had more discord with wife recently but can not explain about what.    Does admit he took an extra Sinamet yesterday (5 instead of 4) and take Suboxone and Adderall he gets from "the community.   States Suboxone is for pain and does not answer what Adderall is for.   Makes reference to this being something that would upset wife if "misrepresented".      He has parkinsons (for which he's prescribed carbidopa levodopa  mg qid rasagiline 1 mg daily pramipexole 0.5 mg bid - confirmed through [facility]). At times overuses his medications, previously taking up to 6 tablets of carbidopa levodopa during a day.  Denies any suicidal intention; attempts at self medicating his parkinsons.  Unsure of suboxone dose though adderall typically 30 mg daily. Last used both earlier today. No alcohol or tobacco use.  Utox only pos for stimulants.  Had neg [facility]CT earlier today.    Earlier today collateral information obtained from wife Faustina [phone] who states that he's been delusional and irritable since the summer. aware that he misuses his parkinsons medication as well as suspecting medications not prescribed to him as has a history of substance abuse (vicodin/opioids).     At end of interview, pt continues to say he does not feel safe and requests police in uniform come to watch him as he feels he can't trust anyone else.   Discussed with pt medication to help with anxiety (eg. Seroquel) but pt denies to accept meds voluntarily.    On ML6, does not appear frankly psychotic, and when asked about events at home, does remember them, but feels these perceptions have subsided. Immediate pain complaints but does not appear in distress, says back pain and sciatica, requests oxy. Writer declined, explained very unlikely given hx. Suggested options of pain mgmt with SNRIs vs gabapentin vs ketorolac, and offered to f/u with NEURO. Pt appreciative and we made plan, continued all home meds for PD. Pt reports he has problems with addiction, feels he needs help, and feels that he can work with team. Disabled by PD symptoms at times, but unclear on degree of misuse of sinemet that has contributed to this. Several sessions today to review meds and medical issues, and thanks MD for attention. Introducted pt to RN so that he is able to ask for help of RN. Denies erlin plan or wish to harm self on unit. Reports he lives with wife, but does not mention any aspects of relationhip.
Yes

## 2024-02-26 NOTE — BH INPATIENT PSYCHIATRY PROGRESS NOTE - NSBHTIMEACTIVITIESPERFORMED_PSY_A_CORE
case review with team  supportive therapy  case review with NEURO-- consult placed
case review with team  supportive therapy  case review with NEURO-- consult done and f/u with med changes again today
case review with team  supportive therapy  case review with NEURO-- consult placed and f/u with med changes
case review with team  supportive therapy  case review with NEURO-- consult placed and f/u with med changes
chart review, pt interview, consultation with neuro, family meeting, discharge planning, documentation

## 2024-02-26 NOTE — BH INPATIENT PSYCHIATRY PROGRESS NOTE - NSBHCHARTREVIEWVS_PSY_A_CORE FT
Vital Signs Last 24 Hrs  T(C): 36.3 (02-22-24 @ 20:56), Max: 36.3 (02-22-24 @ 20:56)  T(F): 97.4 (02-22-24 @ 20:56), Max: 97.4 (02-22-24 @ 20:56)  HR: --  BP: --  BP(mean): --  RR: --  SpO2: --    Orthostatic VS  02-22-24 @ 20:56  Lying BP: --/-- HR: --  Sitting BP: 127/85 HR: 109  Standing BP: 113/88 HR: 118  Site: upper left arm  Mode: electronic  Orthostatic VS  02-21-24 @ 20:35  Lying BP: --/-- HR: --  Sitting BP: 142/96 HR: 104  Standing BP: 137/87 HR: 55  Site: --  Mode: --  
Vital Signs Last 24 Hrs  T(C): 37.1 (02-26-24 @ 20:33), Max: 37.1 (02-26-24 @ 20:33)  T(F): 98.7 (02-26-24 @ 20:33), Max: 98.7 (02-26-24 @ 20:33)  HR: --  BP: --  BP(mean): --  RR: --  SpO2: --    Orthostatic VS  02-26-24 @ 20:33  Lying BP: --/-- HR: --  Sitting BP: 129/87 HR: 96  Standing BP: 139/97 HR: 57  Site: --  Mode: --  Orthostatic VS  02-25-24 @ 09:38  Lying BP: --/-- HR: --  Sitting BP: 126/87 HR: 83  Standing BP: --/-- HR: --  Site: --  Mode: --  
Vital Signs Last 24 Hrs  T(C): --  T(F): --  HR: --  BP: --  BP(mean): --  RR: --  SpO2: --    Orthostatic VS  02-25-24 @ 09:38  Lying BP: --/-- HR: --  Sitting BP: 126/87 HR: 83  Standing BP: --/-- HR: --  Site: --  Mode: --  Orthostatic VS  02-24-24 @ 20:45  Lying BP: --/-- HR: --  Sitting BP: 132/87 HR: 96  Standing BP: 112/80 HR: 72  Site: --  Mode: --  
Vital Signs Last 24 Hrs  T(C): 36.6 (02-21-24 @ 06:53), Max: 36.8 (02-20-24 @ 20:57)  T(F): 97.9 (02-21-24 @ 06:53), Max: 98.3 (02-20-24 @ 20:57)  HR: 108 (02-21-24 @ 06:53) (108 - 108)  BP: 132/89 (02-21-24 @ 06:53) (132/89 - 132/89)  BP(mean): --  RR: --  SpO2: --    Orthostatic VS  02-21-24 @ 20:35  Lying BP: --/-- HR: --  Sitting BP: 142/96 HR: 104  Standing BP: 137/87 HR: 55  Site: --  Mode: --  Orthostatic VS  02-20-24 @ 20:57  Lying BP: --/-- HR: --  Sitting BP: 128/87 HR: 99  Standing BP: 126/87 HR: 100  Site: --  Mode: --  
Vital Signs Last 24 Hrs  T(C): 36.7 (02-18-24 @ 07:46), Max: 36.7 (02-18-24 @ 07:46)  T(F): 98.1 (02-18-24 @ 07:46), Max: 98.1 (02-18-24 @ 07:46)  HR: --  BP: --  BP(mean): --  RR: --  SpO2: --    Orthostatic VS  02-18-24 @ 07:46  Lying BP: --/-- HR: --  Sitting BP: 131/89 HR: 99  Standing BP: --/-- HR: --  Site: --  Mode: --  
Vital Signs Last 24 Hrs  T(C): 36.2 (02-23-24 @ 07:48), Max: 36.2 (02-23-24 @ 07:48)  T(F): 97.1 (02-23-24 @ 07:48), Max: 97.1 (02-23-24 @ 07:48)  HR: 101 (02-23-24 @ 07:48) (101 - 101)  BP: 113/82 (02-23-24 @ 07:48) (113/82 - 113/82)  BP(mean): --  RR: --  SpO2: --    Orthostatic VS  02-23-24 @ 20:31  Lying BP: --/-- HR: --  Sitting BP: 123/84 HR: 94  Standing BP: --/-- HR: --  Site: --  Mode: --  Orthostatic VS  02-22-24 @ 20:56  Lying BP: --/-- HR: --  Sitting BP: 127/85 HR: 109  Standing BP: 113/88 HR: 118  Site: upper left arm  Mode: electronic  
Vital Signs Last 24 Hrs  T(C): 36.6 (02-17-24 @ 07:55), Max: 36.6 (02-17-24 @ 07:55)  T(F): 97.9 (02-17-24 @ 07:55), Max: 97.9 (02-17-24 @ 07:55)  HR: 92 (02-17-24 @ 07:55) (92 - 92)  BP: 135/85 (02-17-24 @ 07:55) (135/85 - 135/85)  BP(mean): --  RR: --  SpO2: --    Orthostatic VS  02-16-24 @ 10:51  Lying BP: --/-- HR: --  Sitting BP: 135/95 HR: 81  Standing BP: 131/97 HR: 98  Site: upper left arm  Mode: --  
Vital Signs Last 24 Hrs  T(C): 37.1 (02-18-24 @ 18:17), Max: 37.1 (02-18-24 @ 18:17)  T(F): 98.8 (02-18-24 @ 18:17), Max: 98.8 (02-18-24 @ 18:17)  HR: 97 (02-19-24 @ 07:08) (97 - 97)  BP: 154/109 (02-19-24 @ 07:08) (154/109 - 154/109)  BP(mean): --  RR: --  SpO2: --    Orthostatic VS  02-18-24 @ 07:46  Lying BP: --/-- HR: --  Sitting BP: 131/89 HR: 99  Standing BP: --/-- HR: --  Site: --  Mode: --

## 2024-02-26 NOTE — BH INPATIENT PSYCHIATRY DISCHARGE NOTE - NSBHASSESSSUMMFT_PSY_ALL_CORE
The patient is a 48-year-old man with a past psychiatric history of opioid use disorder with one rehab stay in 2004, and no prior psychiatric hospitalizations, a past medical history of Parkinson's disease diagnosed a few years ago, who presented to [facility] ED BIB EMS activated by wife due to paranoid ideation in the setting of PD medication (Sinemet) overuse and abuse of stimulants and suboxone obtained from friend.    From a psychiatric standpoint, pt is stable without evidence of acute psychosis, paranoia, or acute mood pathology. Paranoia appears to have resolved, anxiety is well controlled, and pt is sleeping well. He denies SIIP and HIIP.  The patient is no longer an acute danger to himself or others and is stable for discharge home.

## 2024-02-26 NOTE — BH PSYCHOLOGY - GROUP THERAPY NOTE - NSPSYCHOLGRPCOGGOAL_PSY_A_CORE FT
Decrease symptoms, Develop coping/emotion regulation skills, Increase value-based action, Psychoeducation 

## 2024-02-26 NOTE — BH INPATIENT PSYCHIATRY PROGRESS NOTE - NSBHFUPINTERVALCCFT_PSY_A_CORE
reported feeling "good".  F/u for PD and depressed mood with anxiety 
reported feeling "good".  F/u for PD and depressed mood with anxiety 
reported feeling "good".  
reported feeling "good".  F/u for PD and depressed mood with anxiety 
reported feeling "alright". 
f/u paranoia, mood
reported feeling "good".  F/u for PD and depressed mood with anxiety 
reported feeling "alright".

## 2024-02-26 NOTE — BH INPATIENT PSYCHIATRY DISCHARGE NOTE - NSDCCPCAREPLAN_GEN_ALL_CORE_FT
PRINCIPAL DISCHARGE DIAGNOSIS  Diagnosis: Psychosis, unspecified psychosis type  Assessment and Plan of Treatment: Symptoms resolved during current hospitalizatin without need for continued treatment with medication, follow-up with outpatient psychiatric treatment      SECONDARY DISCHARGE DIAGNOSES  Diagnosis: Parkinsons disease  Assessment and Plan of Treatment: Continue taking Sinemet, Pramipexole, and Rasagiline as prescribed. Follow-up with your neurologist Dr. Mccormick for further treatment.

## 2024-02-26 NOTE — BH INPATIENT PSYCHIATRY PROGRESS NOTE - PRN MEDS
MEDICATIONS  (PRN):  carbidopa/levodopa  25/100 1 Tablet(s) Oral daily PRN PD  gabapentin 400 milliGRAM(s) Oral four times a day PRN neuropathic pain/anxiety  lidocaine   4% Patch 1 Patch Transdermal daily PRN Lumbago  QUEtiapine 50 milliGRAM(s) Oral every 4 hours PRN severe agitation/aggression  QUEtiapine 25 milliGRAM(s) Oral every 4 hours PRN anxiety  
MEDICATIONS  (PRN):  carbidopa/levodopa  25/100 1 Tablet(s) Oral daily PRN PD  gabapentin 400 milliGRAM(s) Oral four times a day PRN neuropathic pain/anxiety  lidocaine   4% Patch 1 Patch Transdermal daily PRN Lumbago  QUEtiapine 50 milliGRAM(s) Oral every 4 hours PRN severe agitation/aggression  QUEtiapine 25 milliGRAM(s) Oral every 4 hours PRN anxiety  
MEDICATIONS  (PRN):  gabapentin 300 milliGRAM(s) Oral four times a day PRN neuropathic pain/anxiety  ketorolac 10 milliGRAM(s) Oral every 6 hours PRN pain  ketorolac   Injectable 15 milliGRAM(s) IntraMuscular two times a day PRN back pain/sciatica  lidocaine   4% Patch 1 Patch Transdermal daily PRN Lumbago  QUEtiapine 25 milliGRAM(s) Oral every 4 hours PRN severe anxiety or agitation or aggression  QUEtiapine 100 milliGRAM(s) Oral every 4 hours PRN severe agitation/aggression  
MEDICATIONS  (PRN):  gabapentin 300 milliGRAM(s) Oral four times a day PRN neuropathic pain/anxiety  ketorolac 10 milliGRAM(s) Oral every 6 hours PRN pain  ketorolac   Injectable 15 milliGRAM(s) IntraMuscular two times a day PRN back pain/sciatica  lidocaine   4% Patch 1 Patch Transdermal daily PRN Lumbago  QUEtiapine 25 milliGRAM(s) Oral every 4 hours PRN severe anxiety or agitation or aggression  QUEtiapine 100 milliGRAM(s) Oral every 4 hours PRN severe agitation/aggression  
MEDICATIONS  (PRN):  gabapentin 300 milliGRAM(s) Oral four times a day PRN neuropathic  ketorolac 10 milliGRAM(s) Oral every 6 hours PRN pain  ketorolac   Injectable 15 milliGRAM(s) IntraMuscular two times a day PRN back pain/sciatica  lidocaine   4% Patch 1 Patch Transdermal daily PRN Lumbago  QUEtiapine 25 milliGRAM(s) Oral every 4 hours PRN severe anxiety or agitation or aggression  QUEtiapine 100 milliGRAM(s) Oral every 4 hours PRN severe agitation/aggression  
MEDICATIONS  (PRN):  carbidopa/levodopa  25/100 1 Tablet(s) Oral daily PRN PD  carbidopa/levodopa  25/100 1 Tablet(s) Oral daily PRN PD  gabapentin 400 milliGRAM(s) Oral four times a day PRN neuropathic pain/anxiety  lidocaine   4% Patch 1 Patch Transdermal daily PRN Lumbago  QUEtiapine 50 milliGRAM(s) Oral every 4 hours PRN severe agitation/aggression  QUEtiapine 25 milliGRAM(s) Oral every 4 hours PRN anxiety  
MEDICATIONS  (PRN):  carbidopa/levodopa  25/100 1 Tablet(s) Oral daily PRN PD  gabapentin 400 milliGRAM(s) Oral four times a day PRN neuropathic pain/anxiety  lidocaine   4% Patch 1 Patch Transdermal daily PRN Lumbago  QUEtiapine 50 milliGRAM(s) Oral every 4 hours PRN severe agitation/aggression  QUEtiapine 25 milliGRAM(s) Oral every 4 hours PRN anxiety  
MEDICATIONS  (PRN):  carbidopa/levodopa  25/100 1 Tablet(s) Oral daily PRN PD  gabapentin 400 milliGRAM(s) Oral four times a day PRN neuropathic pain/anxiety  ketorolac 10 milliGRAM(s) Oral every 6 hours PRN pain  lidocaine   4% Patch 1 Patch Transdermal daily PRN Lumbago  QUEtiapine 50 milliGRAM(s) Oral every 4 hours PRN severe agitation/aggression  QUEtiapine 25 milliGRAM(s) Oral every 4 hours PRN anxiety

## 2024-02-26 NOTE — BH INPATIENT PSYCHIATRY DISCHARGE NOTE - NSBHDCBILLING_PSY_ALL_CORE
80428 (Hospital discharge day management; 30 min or less) 23505 (Hospital discharge day management; more than 30 min)

## 2024-02-26 NOTE — BH INPATIENT PSYCHIATRY DISCHARGE NOTE - NSBHMETABOLIC_PSY_ALL_CORE_FT
BMI: BMI (kg/m2): 27.9 (02-16-24 @ 10:51)  HbA1c:   Glucose:   BP: --Vital Signs Last 24 Hrs  T(C): --  T(F): --  HR: --  BP: --  BP(mean): --  RR: --  SpO2: --    Orthostatic VS  02-25-24 @ 09:38  Lying BP: --/-- HR: --  Sitting BP: 126/87 HR: 83  Standing BP: --/-- HR: --  Site: --  Mode: --  Orthostatic VS  02-24-24 @ 20:45  Lying BP: --/-- HR: --  Sitting BP: 132/87 HR: 96  Standing BP: 112/80 HR: 72  Site: --  Mode: --    Lipid Panel:

## 2024-02-26 NOTE — BH INPATIENT PSYCHIATRY DISCHARGE NOTE - NSBHDCHANDOFFFT_PSY_ALL_CORE
Appt: [phone] LM with contact information for MD, no provider assigned yet. Also f/u with Dr. Mccormick, Neurologist

## 2024-02-26 NOTE — BH INPATIENT PSYCHIATRY PROGRESS NOTE - NSBHASSESSSUMMFT_PSY_ALL_CORE
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    From a psychiatric standpoint, pt appears stable without evidence of acute psychosis, paranoia, or acute mood pathology. Paranoia appears to have resolved, anxiety is well controlled, and pt is sleeping well. He denies SIIP and HIIP. He declines trial of Lithium for now. Pt remains concerned about symptoms related to Parkinson's Disease and related treatment.     PLAN:  - D/c Lithium CR 900mg qhs as per pt preference  - C/w Gabapentin 600mg qhs for anxiety/insomnia, in addition caitlin 400mg QID prn anxiety  - Home med Seroquel has been discontinued though pt has prns of Seroquel available (for anxiety); will use/administer with caution given possibility worsening PD symptoms  - Parkinson's Disease:  	- C/w The patient is a 48-year-old man with a past psychiatric history of opioid use disorder with one rehab stay in 2004, and no prior psychiatric hospitalizations, a past medical history of Parkinson's disease diagnosed a few years ago, who presented to [facility] ED BIB EMS activated by wife due to paranoid ideation in the setting of PD medication (Sinemet) overuse and abuse of stimulants and suboxone obtained from friend.    From a psychiatric standpoint, pt appears stable without evidence of acute psychosis, paranoia, or acute mood pathology. Paranoia appears to have resolved, anxiety is well controlled, and pt is sleeping well. He denies SIIP and HIIP. He declines trial of Lithium for now. Pt remains concerned about symptoms related to Parkinson's Disease and related treatment.     PLAN:  - D/c Lithium CR 900mg qhs as per pt preference and given no clear indication at this time  - C/w Gabapentin 600mg qhs for anxiety/insomnia, in addition to 400mg QID prn anxiety  - Seroquel (started on admission) has been discontinued though pt has prns of Seroquel available (for anxiety/agitation); will use/administer with caution given possibility worsening PD symptoms  - Parkinson's Disease:  	- C/w Sinemet IR 25/100 at 6:30am, 9:30am, 12:30pm, 3:30pm, 6:30pm; and once prn between 11pm - 5am  	- C/w Sinemet CR 25/100 at 9:30pm  	- C/w Pramipexole 0.5mg BID  	- C/w Rasagiline 1mg daily   	- Plan discussed with Dr. Roy  - Dispo - to home, will f/u psych with [facility] [employer] in [place], will f/u with outpt neurologist Dr. Eulalio Mccormick

## 2024-02-26 NOTE — BH INPATIENT PSYCHIATRY PROGRESS NOTE - CURRENT MEDICATION
MEDICATIONS  (STANDING):  carbidopa/levodopa  25/100 1 Tablet(s) Oral <User Schedule>  carbidopa/levodopa CR 25/100 1 Tablet(s) Oral <User Schedule>  gabapentin 600 milliGRAM(s) Oral at bedtime  lithium CR (ESKALITH-CR) 900 milliGRAM(s) Oral at bedtime  pramipexole 0.5 milliGRAM(s) Oral two times a day  rasagiline Tablet 1 milliGRAM(s) Oral daily    MEDICATIONS  (PRN):  carbidopa/levodopa  25/100 1 Tablet(s) Oral daily PRN PD  gabapentin 400 milliGRAM(s) Oral four times a day PRN neuropathic pain/anxiety  lidocaine   4% Patch 1 Patch Transdermal daily PRN Lumbago  QUEtiapine 50 milliGRAM(s) Oral every 4 hours PRN severe agitation/aggression  QUEtiapine 25 milliGRAM(s) Oral every 4 hours PRN anxiety   MEDICATIONS  (STANDING):  carbidopa/levodopa  25/100 1 Tablet(s) Oral <User Schedule>  carbidopa/levodopa CR 25/100 1 Tablet(s) Oral <User Schedule>  gabapentin 600 milliGRAM(s) Oral at bedtime  pramipexole 0.5 milliGRAM(s) Oral two times a day  rasagiline Tablet 1 milliGRAM(s) Oral daily    MEDICATIONS  (PRN):  carbidopa/levodopa  25/100 1 Tablet(s) Oral daily PRN PD  gabapentin 400 milliGRAM(s) Oral four times a day PRN neuropathic pain/anxiety  lidocaine   4% Patch 1 Patch Transdermal daily PRN Lumbago  QUEtiapine 50 milliGRAM(s) Oral every 4 hours PRN severe agitation/aggression  QUEtiapine 25 milliGRAM(s) Oral every 4 hours PRN anxiety

## 2024-02-26 NOTE — BH PSYCHOLOGY - GROUP THERAPY NOTE - NSPSYCHOLGRPCOGPROB_PSY_A_CORE FT
Anxiety, Depression, Emotion dysregulation, Lack of coping skills 

## 2024-02-26 NOTE — BH INPATIENT PSYCHIATRY DISCHARGE NOTE - NSDCMRMEDTOKEN_GEN_ALL_CORE_FT
Azilect 1 mg oral tablet: 1 tab(s) orally once a day  carbidopa-levodopa 25 mg-100 mg oral tablet, extended release: 1 tab(s) orally once a day (at bedtime) Take one tablet at 9:30pm  Mirapex 0.5 mg oral tablet: 1 tab(s) orally 2 times a day  Neurontin 400 mg oral capsule: 1 cap(s) orally 3 times a day as needed for neuropathic pain/anxiety  Neurontin 600 mg oral tablet: 1 tab(s) orally once a day (at bedtime)  Sinemet 25 mg-100 mg oral tablet: 1 tab(s) orally 6 times a day Take one tablet at 6:30am, 9:30am, 12:30pm, 3:30pm, 6:30pm; Take one tablet as needed at night (11:00pm - 5:00am)

## 2024-02-26 NOTE — BH INPATIENT PSYCHIATRY PROGRESS NOTE - NSBHMSEAFFQUAL_PSY_A_CORE
Depressed/Irritable/Anxious
Depressed/Anxious
Depressed/Irritable/Anxious
Depressed/Anxious
Depressed/Anxious
Depressed/Irritable/Anxious
Depressed/Irritable/Anxious/Other
Depressed/Anxious

## 2024-02-26 NOTE — BH INPATIENT PSYCHIATRY PROGRESS NOTE - NSBHFUPINTERVALHXFT_PSY_A_CORE
No acute events over the weekend. Pt refused Lithium last night due to concern about worsening Parkinsonian symptoms. Reports concern that the symptoms he was experiencing on his right side are now occurring on his left. Acknowledges that Sinemet is quite effective for his symptoms though concerned that it is not lasting. Despite concern over physical symptoms he is experiencing from PD, pt reports fairly well-controlled anxiety, using prn Gabapentin only 1 - 2x per day. Also reports fair sleep after using prn Sinemet around midnight. Reports stable mood, denies feeling depressed. Denies SIIP and HIIP.  No acute events over the weekend. Pt refused Lithium last night due to concern about worsening Parkinsonian symptoms. Reports concern that the "off" symptoms he was experiencing on his right side are now occurring on his left. Acknowledges that Sinemet is quite effective for his symptoms though concerned that it is not lasting. Despite concern over physical symptoms he is experiencing from PD, pt reports fairly well-controlled anxiety, using prn Gabapentin only 1 - 2x per day. Also reports fair sleep after using prn Sinemet around midnight. Reports stable mood, denies feeling depressed. Denies SIIP and HIIP. Reports feeling hopeful about the future. Acknowledges feeling paranoid prior to hospitalization and says he no longer feels this way. Denies belief that he is being surveilled. Denies AH and VH.  No acute events over the weekend. Pt refused Lithium last night due to concern about worsening Parkinsonian symptoms. Reports concern that the "off" symptoms he was experiencing on his right side are now occurring on his left. Acknowledges that Sinemet is quite effective for his symptoms though concerned that it is not lasting. Despite concern over physical symptoms he is experiencing from PD, pt reports fairly well-controlled anxiety, using prn Gabapentin only 1 - 2x per day. Also reports fair sleep after using prn Sinemet around midnight. Reports stable mood, denies feeling depressed. Denies SIIP and HIIP. Reports feeling hopeful about the future. Acknowledges feeling paranoid prior to hospitalization and says he no longer feels this way. Denies belief that he is being surveilled. Denies AH and VH.     Discussed pt's treatment plan for Parkinson's Disease with Dr. Kirill richards.     Held family meeting with pt, SW, and pt's wife to discuss medication and plans for discharge. Wife agrees that psychiatrically pt is at his baseline, does not endorse any acute safety concerns, advocates for discharge tomorrow.

## 2024-02-26 NOTE — BH PSYCHOLOGY - GROUP THERAPY NOTE - NSPSYCHOLGRPCOGPT_PSY_A_CORE FT
Patient attended recovery oriented/acceptance & commitment therapy group. Group started with a complete the sentence check in - “I feel most present when…” Members responded to the prompt with examples such as spending time with others, getting rest, playing musical instruments and having conversations.  Group then focused on topics of suppression and acceptance. Group used an experiential exercise, the "white polar bear" activity, to demonstrate what happens when we actively avoid an image, memory, thought or emotion. Members shared their reactions to the activity, noting that the white bear entered their mind repeatedly. Group explored what it would be like to allow the “white bear” into their minds. Members were encouraged to consider the possibility of letting unwanted experiences be there without allowing the experience to dictate their actions.  facilitated discussion of concepts, encouraged active participation, and supported members providing feedback to peers.  The group concluded with summary of concepts.
Patient attended recovery oriented/acceptance & commitment therapy group. Group began with check-in question, “What is one thing you are looking forward to the most after leaving the hospital?” Group reflected on themes from yesterday’s group, including acceptance and the “tug of war” metaphor. Group facilitator explored how it can be difficult to accept the current moment, and asked the group if anyone was finding it difficult to “accept” being in the hospital. Several members shared they found their present circumstances difficult to accept, but at the same time, recognized the reason/need for being in the hospital. Group facilitator highlighted the many dialectics we experience in life. Discussed that two opposing ideas or feelings can exist simultaneously (“I can miss home and not want to be here, but also know that this is what I need to get better.”) Encouraged group to reflect on their own contradictory feelings or experiences, and gave the example of something being bittersweet.  Asked group to reflect on an experience they would label as bittersweet, members gave their own definition of the term, others applied it to a personal experience.  facilitated discussion of concepts and encouraged active participation. The group concluded with a summary and consensus that the value of the day would remain Acceptance. 
Patient attended recovery oriented/acceptance & commitment therapy group. The group started with the brief check in question: "What is your favorite movie of all time?". The group then focused on topics of mindfulness and cognitive defusion. Patients participated in a challenging word find puzzle first on their own, and then with the help of the other group members. Pt's shared examples of strategies they used to solve the word-find, as well as what helped them to stay present during the activity as they worked alone, as well as a group.  facilitated discussion of concepts, encouraged active participation, and supported members providing feedback to peers. The group concluded with a summary and choosing "mindfulness" as the value of the day.

## 2024-02-26 NOTE — BH INPATIENT PSYCHIATRY PROGRESS NOTE - ADDITIONAL DETAILS / COMMENTS
improving, agrees to neuro consult, discussed medication misuse
improving, agrees to neuro consult, discusses medication misuse
improving, agrees to neuro consult, discussed medication misuse
both improving, agrees to neuro consult, discusses medication misuse
improving, agrees to neuro consult, discusses medication misuse

## 2024-02-26 NOTE — BH INPATIENT PSYCHIATRY PROGRESS NOTE - NSDCCRITERIA_PSY_ALL_CORE
cgi<=2  No SI or psychosos with euthymia
cgi<=2  No SI or psychosis with euthymia  mood stabilizer regimen
cgi<=2  No SI or psychosis with euthymia  mood stabilizer regimen
cgi<=2  No SI or psychosos with euthymia
cgi<=2  No SI or psychosos with euthymia
cgi<=2  No SI or psychosis with euthymia  mood stabilizer regimen

## 2024-02-26 NOTE — BH PSYCHOLOGY - GROUP THERAPY NOTE - NSBHPSYCHOLASSESSPROV_PSY_A_CORE
Psychology Trainee only
Licensed Psychologist and Psychology Trainee
Licensed Psychologist and Psychology Trainee

## 2024-02-26 NOTE — BH INPATIENT PSYCHIATRY PROGRESS NOTE - NSTXPAINGOAL_PSY_ALL_CORE
Will identify 1 coping skill that distracts from pain

## 2024-02-26 NOTE — BH INPATIENT PSYCHIATRY PROGRESS NOTE - NSBHMSETHTPROC_PSY_A_CORE
Linear/Circumstantial/Other
Linear/Circumstantial
Linear/Circumstantial/Other
Linear/Circumstantial
Linear/Circumstantial
Linear/Circumstantial/Other

## 2024-02-26 NOTE — BH INPATIENT PSYCHIATRY PROGRESS NOTE - NSBHMSEMOVE_PSY_A_CORE
Tremors/Rigidity/Other
Rigidity/Other
Tremors/Rigidity/Other
Rigidity/Other

## 2024-02-26 NOTE — BH INPATIENT PSYCHIATRY PROGRESS NOTE - NSBHMETABOLIC_PSY_ALL_CORE_FT
BMI: BMI (kg/m2): 27.9 (02-16-24 @ 10:51)  HbA1c:   Glucose:   BP: 154/109 (02-19-24 @ 07:08) (135/85 - 154/109)Vital Signs Last 24 Hrs  T(C): 37.1 (02-18-24 @ 18:17), Max: 37.1 (02-18-24 @ 18:17)  T(F): 98.8 (02-18-24 @ 18:17), Max: 98.8 (02-18-24 @ 18:17)  HR: 97 (02-19-24 @ 07:08) (97 - 97)  BP: 154/109 (02-19-24 @ 07:08) (154/109 - 154/109)  BP(mean): --  RR: --  SpO2: --    Orthostatic VS  02-18-24 @ 07:46  Lying BP: --/-- HR: --  Sitting BP: 131/89 HR: 99  Standing BP: --/-- HR: --  Site: --  Mode: --    Lipid Panel: 
BMI: BMI (kg/m2): 27.9 (02-16-24 @ 10:51)  HbA1c:   Glucose:   BP: 113/82 (02-23-24 @ 07:48) (113/82 - 132/89)Vital Signs Last 24 Hrs  T(C): 36.2 (02-23-24 @ 07:48), Max: 36.2 (02-23-24 @ 07:48)  T(F): 97.1 (02-23-24 @ 07:48), Max: 97.1 (02-23-24 @ 07:48)  HR: 101 (02-23-24 @ 07:48) (101 - 101)  BP: 113/82 (02-23-24 @ 07:48) (113/82 - 113/82)  BP(mean): --  RR: --  SpO2: --    Orthostatic VS  02-23-24 @ 20:31  Lying BP: --/-- HR: --  Sitting BP: 123/84 HR: 94  Standing BP: --/-- HR: --  Site: --  Mode: --  Orthostatic VS  02-22-24 @ 20:56  Lying BP: --/-- HR: --  Sitting BP: 127/85 HR: 109  Standing BP: 113/88 HR: 118  Site: upper left arm  Mode: electronic    Lipid Panel: 
BMI: BMI (kg/m2): 27.9 (02-16-24 @ 10:51)  HbA1c:   Glucose:   BP: 132/89 (02-21-24 @ 06:53) (117/83 - 132/89)Vital Signs Last 24 Hrs  T(C): 36.3 (02-22-24 @ 20:56), Max: 36.3 (02-22-24 @ 20:56)  T(F): 97.4 (02-22-24 @ 20:56), Max: 97.4 (02-22-24 @ 20:56)  HR: --  BP: --  BP(mean): --  RR: --  SpO2: --    Orthostatic VS  02-22-24 @ 20:56  Lying BP: --/-- HR: --  Sitting BP: 127/85 HR: 109  Standing BP: 113/88 HR: 118  Site: upper left arm  Mode: electronic  Orthostatic VS  02-21-24 @ 20:35  Lying BP: --/-- HR: --  Sitting BP: 142/96 HR: 104  Standing BP: 137/87 HR: 55  Site: --  Mode: --    Lipid Panel: 
BMI: BMI (kg/m2): 27.9 (02-16-24 @ 10:51)  HbA1c:   Glucose:   BP: 132/89 (02-21-24 @ 06:53) (117/83 - 154/109)Vital Signs Last 24 Hrs  T(C): 36.6 (02-21-24 @ 06:53), Max: 36.8 (02-20-24 @ 20:57)  T(F): 97.9 (02-21-24 @ 06:53), Max: 98.3 (02-20-24 @ 20:57)  HR: 108 (02-21-24 @ 06:53) (108 - 108)  BP: 132/89 (02-21-24 @ 06:53) (132/89 - 132/89)  BP(mean): --  RR: --  SpO2: --    Orthostatic VS  02-21-24 @ 20:35  Lying BP: --/-- HR: --  Sitting BP: 142/96 HR: 104  Standing BP: 137/87 HR: 55  Site: --  Mode: --  Orthostatic VS  02-20-24 @ 20:57  Lying BP: --/-- HR: --  Sitting BP: 128/87 HR: 99  Standing BP: 126/87 HR: 100  Site: --  Mode: --    Lipid Panel: 
BMI: BMI (kg/m2): 27.9 (02-16-24 @ 10:51)  HbA1c:   Glucose:   BP: 135/85 (02-17-24 @ 07:55) (135/85 - 135/85)Vital Signs Last 24 Hrs  T(C): 36.7 (02-18-24 @ 07:46), Max: 36.7 (02-18-24 @ 07:46)  T(F): 98.1 (02-18-24 @ 07:46), Max: 98.1 (02-18-24 @ 07:46)  HR: --  BP: --  BP(mean): --  RR: --  SpO2: --    Orthostatic VS  02-18-24 @ 07:46  Lying BP: --/-- HR: --  Sitting BP: 131/89 HR: 99  Standing BP: --/-- HR: --  Site: --  Mode: --    Lipid Panel: 
BMI: BMI (kg/m2): 27.9 (02-16-24 @ 10:51)  HbA1c:   Glucose:   BP: --Vital Signs Last 24 Hrs  T(C): --  T(F): --  HR: --  BP: --  BP(mean): --  RR: --  SpO2: --    Orthostatic VS  02-25-24 @ 09:38  Lying BP: --/-- HR: --  Sitting BP: 126/87 HR: 83  Standing BP: --/-- HR: --  Site: --  Mode: --  Orthostatic VS  02-24-24 @ 20:45  Lying BP: --/-- HR: --  Sitting BP: 132/87 HR: 96  Standing BP: 112/80 HR: 72  Site: --  Mode: --    Lipid Panel: 
BMI: BMI (kg/m2): 27.9 (02-16-24 @ 10:51)  HbA1c:   Glucose:   BP: 135/85 (02-17-24 @ 07:55) (135/85 - 135/85)Vital Signs Last 24 Hrs  T(C): 36.6 (02-17-24 @ 07:55), Max: 36.6 (02-17-24 @ 07:55)  T(F): 97.9 (02-17-24 @ 07:55), Max: 97.9 (02-17-24 @ 07:55)  HR: 92 (02-17-24 @ 07:55) (92 - 92)  BP: 135/85 (02-17-24 @ 07:55) (135/85 - 135/85)  BP(mean): --  RR: --  SpO2: --    Orthostatic VS  02-16-24 @ 10:51  Lying BP: --/-- HR: --  Sitting BP: 135/95 HR: 81  Standing BP: 131/97 HR: 98  Site: upper left arm  Mode: --    Lipid Panel: 
BMI: BMI (kg/m2): 27.9 (02-16-24 @ 10:51)  HbA1c:   Glucose:   BP: --Vital Signs Last 24 Hrs  T(C): 37.1 (02-26-24 @ 20:33), Max: 37.1 (02-26-24 @ 20:33)  T(F): 98.7 (02-26-24 @ 20:33), Max: 98.7 (02-26-24 @ 20:33)  HR: --  BP: --  BP(mean): --  RR: --  SpO2: --    Orthostatic VS  02-26-24 @ 20:33  Lying BP: --/-- HR: --  Sitting BP: 129/87 HR: 96  Standing BP: 139/97 HR: 57  Site: --  Mode: --  Orthostatic VS  02-25-24 @ 09:38  Lying BP: --/-- HR: --  Sitting BP: 126/87 HR: 83  Standing BP: --/-- HR: --  Site: --  Mode: --    Lipid Panel:

## 2024-02-26 NOTE — BH PSYCHOLOGY - GROUP THERAPY NOTE - TOKEN PULL-DIAGNOSIS
Primary Diagnosis:  Psychosis, unspecified psychosis type [F29]        Problem Dx:   

## 2024-02-26 NOTE — BH INPATIENT PSYCHIATRY DISCHARGE NOTE - HOSPITAL COURSE
Updated by Dr. [email or web address] [email or web address]    Pt on BOLAÑOS medication? N    BD: [birth year]  CLINICAL COURSE  VOL Admit dates on [facility]: 2/16/24 to 2/27/24  Pt arrived to the [facility] unit in the above context. Vamshi arrived to the unit with attenuating psychosis and notably was able to recall use of Adderall that had a apparently driven recent psychosis episode(s). He stated he attempted to use this medication for energy given his Parkinson’s disease and strong feeling that Sinemet PD DA agonist medication was not adequately addressing Parkinson symptoms with incapacitation, often lying in bed unable to move and even feeling notably vulnerable during these episodes (r/o paranoia). Patient felt that his mood was stable and even good otherwise but at times of prominent Parkinsonian symptoms he did report a prominent drop in mood. Patient had resorted to overuse of his Sinemet Rx 5 to 8 times daily, but stated he had not reported this to his o/p neurologist. Pt desired mood stabilization and reduction in anxiety, but approached with much caution given dopaminergic medication interactions with PD. Patient was noted to be on sinemet dose four times a day and pramipexole and rasagiline MAOI. Neurology was consulted for optimization of medication and patient did report overuse of sinemet and outside neurologist was consulted by NEURO team for collateral. Consequently, sinemet was increased to five times daily with more optimal scheduling and then after initial dose of IR q AM, sinemet was changed to controlled release formulation four times daily with an optional dose at bedtime of immediately release. This regimen also left patient incapacitated, and he felt with cognitive impairments were addressed but movement impairments were not. Team had started Seroquel which was uptitrated to 150 mg and team used much caution with dopaminergic medication given PD. NEURO team suggested that seroquel should be stopped because of a worsening of Parkinson's motor symptoms, even with this very cautious uptitration. Consequently, team opted for Neurontin as a sleep aid and anxiolytic and also for back pain, after several doses of ketorolac IM and PO had been given. Patient was noted to request sinemet early on above schedule and consequently four 4-h intervals were reduced to 3.5 h intervals given satisfaction with controlled release; patient again requested IR formulation so CR was given only at bedtime. Patient did continue to have much frustration at times re PD symptoms with mood fluctuations and so team suggested trial of low-dose lithium. Writer was away and covering MD handled meeting with wife which was successful for psychoeducation on medication usage, but patient had refused trial of lithium Due to concern for side effects. Patient was discharged uneventfully on new sinemet schedule. He began to engage groups and regain his mobility, and consequently, had no new complaints. He was remorseful about his use of Adderall, which has triggered psychosis prior to admission/resulting in admission, and pledged no further misuse of street Suboxone for pain management, as pt also had noted hx of polysubstance use. Patient was deemed psychiatrically stable for discharge with no evident psychosis and no wish for self harm or harm of others and adequate behavioral control with improved Parkinsonian symptom control and no AVTH and no stacy or depression and mood much improved and anxiety much attenuated. After care was arranged in a community based clinic with pt to f/u with his NEURO provider.  Low acute risk of harm to self or suicide. The patient is at elevated chronic risk of self-harm due to an underlying mood disorder in context of significant medical comorbid. Other risk factors include substance use, at times poor frustration tolerance, and impulsivity. Protective factors for suicide include improved insight, treatment engagement, medication adherence and optimization, lack of access to firearms, supportive social network, future-oriented, patient denies current SIIP and SIIP throughout hospitalization. Risk factors mitigated during this hospitalization include poor frustration tolerance and impulsivity and medication and illicit rx medication misuse.  Low acute risk of violence or aggression. Risk factors include paranoid delusions, substance use, impulsivity, at times poor frustration tolerance. Protective factors include denies current homicidal, aggressive, or violent ideation, intent, or plan, patient was in good behavioral control in second half of hospitalization, with improvements in insight. Risk factors mitigated during this hospitalization include poor frustration tolerance and impulsivity and medication and illicit rx medication misuse.    No other acute MED issues during stay.  Please contact Dr. Muniz/Arnulfo Attending and Unit Chief, if any questions:  [phone] or [phone] or [email or web address]  See DISCHARGE PLAN and Rx meds at discharge, below.    DSM5 DD  MDD+P, current episode depressed+P w/o catatonia  r/o 2/2 a medical condition (PD)  r/o BAD2  Stimulant use disorder  Opiod use disorder  Anxiety disorder NOS  r/o personality disorder in adult/cluster b    DISCHARGE PLAN  1)	Pt stable for DC to home with wife with Rx meds via [facility];  2)	Psychopharm, as below:    Home Medications at time of Discharge Reconciliation: 26-Feb-2024 14:39    Azilect 1 mg oral tablet 1 tab(s) orally once a day ; Active    carbidopa-levodopa 25 mg-100 mg oral tablet, extended release 1 tab(s) orally once a day (at bedtime) Take one tablet at 9:30pm ; Active    Mirapex 0.5 mg oral tablet 1 tab(s) orally 2 times a day ; Active    Neurontin 400 mg oral capsule 1 cap(s) orally 3 times a day as needed for neuropathic pain/anxiety ; Active    Neurontin 600 mg oral tablet 1 tab(s) orally once a day (at bedtime) ; Active    Sinemet 25 mg-100 mg oral tablet 1 tab(s) orally 6 times a day Take one tablet at 6:30am, 9:30am, 12:30pm, 3:30pm, 6:30pm; Take one tablet as needed at night (11:00pm - 5:00am) ; Active     3)	Long Acting Injectible medication (BOLAÑOS): None.    4)	[employer] [phone]. Also f/u with Dr. Mccormick, Neurologist.    5)	Medical issues needing f/u: Parkinson’s

## 2024-02-26 NOTE — BH INPATIENT PSYCHIATRY PROGRESS NOTE - NSBHMSEMOOD_PSY_A_CORE
Normal/Anxious/Other
Normal/Anxious/Irritable
Normal/Other
Anxious/Irritable
Normal/Anxious/Other
Normal/Other
Normal/Anxious/Other
Normal/Anxious/Other

## 2024-02-26 NOTE — BH PSYCHOLOGY - GROUP THERAPY NOTE - NSPSYCHOLGRPCOGINT_PSY_A_CORE FT
Acceptance & commitment therapy, Emotion regulation/coping skills taught, Psychoeducation  

## 2024-02-26 NOTE — BH INPATIENT PSYCHIATRY PROGRESS NOTE - NSTXSUBMISGOAL_PSY_ALL_CORE
Will verbalize 2 areas that serve as motivation for change as it pertains to addiction

## 2024-02-26 NOTE — BH INPATIENT PSYCHIATRY PROGRESS NOTE - NSBHATTESTTYPEVISIT_PSY_A_CORE
HUMBLE without on-site Attending supervision
HUMBLE without on-site Attending supervision
Attending Only
HUMBLE without on-site Attending supervision
Attending Only
Attending Only

## 2024-02-26 NOTE — BH PSYCHOLOGY - GROUP THERAPY NOTE - NSPSYCHOLGRPCOGPT_PSY_A_CORE
participated in exercise/shared positive coping experience/other...

## 2024-02-27 VITALS — DIASTOLIC BLOOD PRESSURE: 88 MMHG | SYSTOLIC BLOOD PRESSURE: 132 MMHG | TEMPERATURE: 97 F | HEART RATE: 90 BPM

## 2024-02-27 RX ADMIN — CARBIDOPA AND LEVODOPA 1 TABLET(S): 25; 100 TABLET ORAL at 12:13

## 2024-02-27 RX ADMIN — CARBIDOPA AND LEVODOPA 1 TABLET(S): 25; 100 TABLET ORAL at 06:39

## 2024-02-27 RX ADMIN — PRAMIPEXOLE DIHYDROCHLORIDE 0.5 MILLIGRAM(S): 0.12 TABLET ORAL at 09:25

## 2024-02-27 RX ADMIN — CARBIDOPA AND LEVODOPA 1 TABLET(S): 25; 100 TABLET ORAL at 01:25

## 2024-02-27 RX ADMIN — RASAGILINE 1 MILLIGRAM(S): 0.5 TABLET ORAL at 09:25

## 2024-02-27 RX ADMIN — CARBIDOPA AND LEVODOPA 1 TABLET(S): 25; 100 TABLET ORAL at 09:25

## 2024-02-27 NOTE — BH PSYCHOLOGY - CLINICIAN PSYCHOTHERAPY NOTE - NSBHPSYCHOLGOALS_PSY_A_CORE FT
Decrease symptoms, Develop coping/emotion regulation skills, Increase value-based action, Psychoeducation  
Decrease symptoms, Develop coping/emotion regulation skills, Increase value-based action, Psychoeducation  
Yes

## 2024-02-27 NOTE — BH PSYCHOLOGY - CLINICIAN PSYCHOTHERAPY NOTE - TOKEN PULL-DIAGNOSIS
Primary Diagnosis:  Psychosis, unspecified psychosis type [F29]        Problem Dx:   
Primary Diagnosis:  Psychosis, unspecified psychosis type [F29]        Problem Dx:

## 2024-02-27 NOTE — BH PSYCHOLOGY - CLINICIAN PSYCHOTHERAPY NOTE - NSBHPSYCHOLINT_PSY_A_CORE FT
Acceptance & commitment therapy, Emotion regulation/coping skills taught, Psychoeducation  
Acceptance & commitment therapy, Emotion regulation/coping skills taught, Psychoeducation

## 2024-02-27 NOTE — BH PSYCHOLOGY - CLINICIAN PSYCHOTHERAPY NOTE - NSTXSLPPATDATEEST_PSY_ALL_CORE
[Appropriately responsive] : appropriately responsive [Alert] : alert [No Acute Distress] : no acute distress [No Lymphadenopathy] : no lymphadenopathy [Oriented x3] : oriented x3 [Examination Of The Breasts] : a normal appearance [No Discharge] : no discharge [No Masses] : no breast masses were palpable [Labia Majora] : normal [Labia Minora] : normal [Normal] : normal [Uterine Adnexae] : normal 21-Feb-2024

## 2024-02-27 NOTE — BH PSYCHOLOGY - CLINICIAN PSYCHOTHERAPY NOTE - NSTXSUBMISGOAL_PSY_ALL_CORE
Will verbalize 2 areas that serve as motivation for change as it pertains to addiction
Will verbalize 2 areas that serve as motivation for change as it pertains to addiction

## 2024-02-27 NOTE — BH DISCHARGE NOTE NURSING/SOCIAL WORK/PSYCH REHAB - DISCHARGE INSTRUCTIONS AFTERCARE APPOINTMENTS
In order to check the location, date, or time of your aftercare appointment, please refer to your Discharge Instructions Document given to you upon leaving the hospital.  If you have lost the instructions please call [phone]

## 2024-02-27 NOTE — BH PSYCHOLOGY - CLINICIAN PSYCHOTHERAPY NOTE - NSBHPSYCHOLNARRATIVE_PSY_A_CORE FT
Engaged in initial therapy session. Focused on rapport building and information gathering. Pt reported that he would be interested in participating individual therapy sessions.     Writer and patient met in patient's room due to lack of space. Patient required help sitting up, as he was experiencing tremors, muscle rigidity and involuntary bodily movements due to Parkinson's diagnosis. Patient reported he was "in a bad moment," but wanted to have the psychotherapy session despite physical symptoms. Provided space for patient to process feelings about hospitalization. Patient reports he is "doing okay," and cites he would like to focus on getting his medication adjusted while he is here.     Session focused on patient’s insight into triggering events leading to hospitalization, which included escalating feelings of paranoia related to his wife and his own "self medication" with adderall. Patient reflected he would like to work on processing the loss of his identity as he feels less of a man as someone with Parkinson's. Patient also explored conflict with his wife, who describes as a narcissist. Further explored patient's goals for treatment, provided emotional validation and support. Patient was insightful, receptive to support and feedback.    Patient participated in psychotherapy session. Patient presented with adequate grooming and was dressed appropriately. Patient maintained appropriate eye contact and demonstrated a cooperative attitude. Due to Parkinson's diagnosis, patient experienced tremors and involuntary bodily movements throughout session. It appeared patient had reduced facial expression ability and muscle rigidity during session. Patient's mood appeared slightly anxious, congruent affect. At times, patient became tearful. Speech was within normal limits. No perceptual disturbances noted or observed. Patient's thought process was linear and coherent, at times circumstantial. Patient's thought content was significant for notable for concerns about his wife and feeling insecure. No suicidal ideation/intent/plan. No HI endorsed. Insight, judgement, and impulse control are fair.
Engaged in therapy session. Focused on discharge planning and provided space for patient to process feelings about hospitalization.  Writer and patient met in patient's room due to patient's current condition. Patient was unable to sit up as he was experiencing tremors and muscle rigidity due to Parkinson's diagnosis. Patient reported he is "doing okay," despite his physical condition and is looking forward to leaving the unit later today. Discussed patient's goals for discharge, which include exercising, eating mindfully and spending time with his son. Patient also reflected that he is looking forward to being honest, as he now has a "blank slate." Explored patient's tendency to be dishonest in the past, and how this impacted several areas of his life. Patient also reported looking forward to attending therapy sessions post-discharge,  meeting with his neurologist, and "being fully transparent." During session, patient appeared preoccupied about getting another dose of his medication before 1pm, as he hoped to be able to "walk out of here instead of being wheeled out." Writer checked-in with patient's nursing staff who informed they would be giving patient medication shortly. Processed feelings about brief nature of therapy, and provided emotional validation and support. Patient was insightful, receptive to support and feedback.  Patient participated in psychotherapy session. Patient presented with adequate grooming and was dressed appropriately. Patient maintained appropriate eye contact and demonstrated a cooperative attitude. Due to Parkinson's diagnosis, patient experienced tremors and muscle rigidity throughout session. It appeared patient had reduced facial expression ability and muscle rigidity during session. Patient's mood appeared slightly anxious, congruent affect. At times, patient became tearful. Speech was within normal limits. No perceptual disturbances noted or observed. Patient's thought process was linear and coherent, at times circumstantial. Patient's thought content was significant for notable for concerns about his relationship with his wife. No suicidal ideation/intent/plan. No HI endorsed. Insight, judgement, and impulse control are fair.

## 2024-02-27 NOTE — BH DISCHARGE NOTE NURSING/SOCIAL WORK/PSYCH REHAB - NSCDUDCCRISIS_PSY_A_CORE
[facility] Well  [phone] [facility]-WELL ([phone])  Text "WELL" to 00928  Website: www.[employer]/.[facility] 1 [phone]) 418-HOPE (2249) Website: www.safehorizon.org/.  [facility] - [facility] – Crisis Care for Children, Adults and Families  [address] [place] [address]  Mobile Crisis Hotline – (811) 134-6784/.  [facility] Response Crisis Hotline  (699) 848-1280  24 hour telephone crisis intervention and suicide prevention hotline concerned with all mental health issues/.  [facility]  [address] [place] [address], [place] [address] [phone]   Hours:  Monday through Friday from 9 AM to 3 PM/988 Suicide and Crisis Lifeline

## 2024-02-27 NOTE — BH DISCHARGE NOTE NURSING/SOCIAL WORK/PSYCH REHAB - NSDCPRGOAL_PSY_ALL_CORE
Writer met with patient for an individual session to review overall progress towards rehabilitation goals and to assess current functioning.  Patient was hospitalized for paranoia elated to medication misuse. In session patient was receptive and cooperative. Overall patient has demonstrated progress towards psychiatric symptoms. Patient reported paranoia reducing compared to prior hospitalization. Patient has shown improvement in mood in recent days. Moreover, patient reported fair controlled anxiety and not being depressed. Patient appetite has been fair and is sleeping well. Patient has been compliant with medication. In session patient reported concerns about Parkinsonian symptoms. Patient reported symptoms on right side of body are occurring on the left. Moreover, patient reported not being able to move on left side. In session patient reported his plan to see neurologist and hopeful about future and Parkinson symptoms getting better. Patient has demonstrated progress towards psychiatric rehabilitation goals of identifying 2 coping skills in assisting in organizing.  Patient identified listening to music and driving as two coping skills. On the unit patient was fairly visible. Patient attended a fair amount of group sessions. On the unit patient interacted and socialized with peers and staff and was not a behavioral issue. Patient denied SI/SH/AH/VH/HI and was able to engage in safety planning.

## 2024-02-27 NOTE — BH DISCHARGE NOTE NURSING/SOCIAL WORK/PSYCH REHAB - NSDCPRRECOMMEND_PSY_ALL_CORE
Upon discharge patient has aftercare appointment at [facility]. Writer recommends patient attend appointment to continue further treatement

## 2024-02-27 NOTE — BH DISCHARGE NOTE NURSING/SOCIAL WORK/PSYCH REHAB - PATIENT PORTAL LINK FT
You can access the FollowMyHealth Patient Portal offered by [facility] by registering at the following website: [email or web address]. By joining [facility]s FollowMyHealth portal, you will also be able to view your health information using other applications (apps) compatible with our system.
